# Patient Record
Sex: FEMALE | Race: WHITE
[De-identification: names, ages, dates, MRNs, and addresses within clinical notes are randomized per-mention and may not be internally consistent; named-entity substitution may affect disease eponyms.]

---

## 2017-01-18 ENCOUNTER — HOSPITAL ENCOUNTER (OUTPATIENT)
Dept: HOSPITAL 75 - CARD | Age: 47
End: 2017-01-18
Attending: INTERNAL MEDICINE
Payer: COMMERCIAL

## 2017-01-18 DIAGNOSIS — Z72.0: ICD-10-CM

## 2017-01-18 DIAGNOSIS — L98.499: ICD-10-CM

## 2017-01-18 DIAGNOSIS — M35.8: ICD-10-CM

## 2017-01-18 DIAGNOSIS — I77.1: Primary | ICD-10-CM

## 2017-01-18 PROCEDURE — 93306 TTE W/DOPPLER COMPLETE: CPT

## 2017-01-19 NOTE — ECHOCARDIOGRAPHY REPORT
PROCEDURE PHYSICIAN:   BRAULIO REED

 

DATE OF PROCEDURE:  01/18/2017

 

TWO DIMENSIONAL ECHOCARDIOGRAM REPORT 

 

PRIMARY PHYSICIAN:       

OTHER PHYSICIAN:         

REFERRING PHYSICIAN:          Dr. Tirstan 

ORDERING PHYSICIAN:      

 

INDICATION FOR THE PROCEDURE:  

1.   Coronary artery disease.  

2.   Dyspnea.  

 

MEASUREMENTS                  DERIVED VALUES 

 

LV DIAMETER (LAX)   NORMALS                       NORMALS

Diastolic      4.4  (3.6-5.2)      Eject. Fract.  60% (60%+/-6%)

      

Systolic            (2.3-3.9)      Diastolic Vol.      

% Shortening        (0.22-0.42)    Systolic Vol.       

                                   Aortic Root         

IVS THICKNESS 

Diastolic      1    (0.6-1.1)

 

LVPW THICKNESS 

Diastolic      1    (0.6-1.1)

 

LA DIAMETER 

Systolic       2.8  (2.1-3.7)  

 

 

FINDINGS:

1.   Technical quality is good. 

2.   The left ventricle is normal in size with normal

contractility. Systolic function appeared to be normal. Estimated

ejection fraction 60%. 

3.   The left atrium is normal in size. No clot or thrombus were

seen within the left atrium. 

4.   The right atrium and right ventricle are normal in size. No

clot or thrombus were seen within the right side. 

5.   Mitral valve is normal in morphology with mild mitral

regurgitation noted by color Doppler flow. No mitral valve

prolapse. No mitral valve stenosis. 

6.   Aortic valve is trileaflet with normal opening and closing

pattern. No significant aortic stenosis or regurgitation was

seen. 

7.   Tricuspid valve is normal in morphology with mild tricuspid

regurgitation noted by color Doppler flow. Doppler across

tricuspid valve estimated pulmonary artery pressure of 6+ right

atrial pressure. 

8.   Pulmonic valve is functioning normally. 

9.   No pericardial effusion. 

 

IN CONCLUSION:

1.   Normal left ventricular size and systolic function.

Estimated ejection fraction 60%. 

2.   Mild mitral and tricuspid regurgitation. 

3.   Estimated pulmonary artery pressure of 10 to 15 mmHg.   

 

 

 

 

Job ID: [id number]

Dictated Date: 01/18/2017 16:46:11 

Transcription Date: 01/19/2017 07:48:13 / feng

## 2017-01-23 ENCOUNTER — HOSPITAL ENCOUNTER (OUTPATIENT)
Dept: HOSPITAL 75 - CARD | Age: 47
End: 2017-01-23
Attending: INTERNAL MEDICINE
Payer: COMMERCIAL

## 2017-01-23 VITALS — HEIGHT: 67 IN | WEIGHT: 183 LBS | BODY MASS INDEX: 28.72 KG/M2

## 2017-01-23 VITALS — SYSTOLIC BLOOD PRESSURE: 152 MMHG | DIASTOLIC BLOOD PRESSURE: 83 MMHG

## 2017-01-23 DIAGNOSIS — I77.1: ICD-10-CM

## 2017-01-23 DIAGNOSIS — I73.9: ICD-10-CM

## 2017-01-23 DIAGNOSIS — Z01.810: Primary | ICD-10-CM

## 2017-01-23 DIAGNOSIS — L98.499: ICD-10-CM

## 2017-01-23 DIAGNOSIS — R06.02: ICD-10-CM

## 2017-01-23 DIAGNOSIS — M35.8: ICD-10-CM

## 2017-01-23 DIAGNOSIS — Z72.0: ICD-10-CM

## 2017-01-23 PROCEDURE — 93017 CV STRESS TEST TRACING ONLY: CPT

## 2017-01-23 PROCEDURE — 78452 HT MUSCLE IMAGE SPECT MULT: CPT

## 2017-01-23 NOTE — XMS REPORT
Continuity of Care Document

 Created on: 10/03/2016



TANIA WILLIAM

External Reference #: [id number]

: 1970

Sex: Female



Demographics







 Address  [address] [place]  [address]

 

 Home Phone  [phone]

 

 Preferred Language  English

 

 Marital Status  Unknown

 

 Yarsanism Affiliation  Unknown

 

 Race  Unknown

 

 Ethnic Group  Unknown





Author







 Author  [facility]

 

 Organization  [facility]

 

 Address  Unknown

 

 Phone  Unavailable







Support







 Name  Relationship  Address  Phone

 

 DEEPAK LEE APRN  Caregiver  [address] [place]  [address] [phone]

 

 ANDREW EWING DO  Caregiver  [address] [place]  [address] [phone]

 

 GENEVIEVE WILLIAM  Next Of Kin  [address] [place]  [address] [phone]







Care Team Providers







 Care Team Member Name  Role  Phone

 

 ANDREW EWING DO  PCP  [phone]







Insurance Providers







 Payer Name  Policy Number  Subscriber Name  Relationship

 

 [employer]  [id number]  Tania William  16 Self / Same As Patient







Advance Directives







 Directive  Response  Recorded Date/Time

 

 Advance Directives  No  16 11:49am

 

 Health Care Power of   No  16 11:49am

 

 Organ Donor  No  16 11:49am







Problems

Active Problems







 Medical Problem  Onset Date  Status

 

 Raynauds syndrome  Unknown  Acute







Medications

Current Home Medications







 Medication  Dose  Units  Route  Directions  Days/Qty  Instructions  Start Date

 

 Tiotropium Bromide 4 Gm  2  Puff  Inhalation  Once as needed for Daily        
16

 

 Budesonide/Formoterol Fumarate 10.2 Gm  1  Puff  Inhalation  Daily        

 

 Aspirin 81 Mg  81  Mg  Oral  Daily        16

 

 Meclizine Hcl 25 Mg  25  Mg  Oral  Twice A Day as needed for Vertigo        

 

 Naproxen 500 Mg  500  Mg  Oral  Twice A Day With Meals        16

 

 Gabapentin 300 Mg  300  Mg  Oral  Twice A Day        16

 

 Loratadine 10 Mg  10  Mg  Oral  Daily        16

 

 Amlodipine Besylate 10 Mg  10  Mg  Oral  Daily        16

 

 Povidone-Iodine 59 Ml        Topical  Twice A Day     FOR USE ON LEFT HAND 
MIDDLE FINGER  16

 

 Clopidogrel Bisulfate 75 Mg  75  Mg  Oral  Daily  16

 

 Pantoprazole Sodium 40 Mg  40  Mg  Oral  Daily  16





Past Home Medications







 Medication  Directions  Ordered  Status

 

 Methylprednisolone 4 Mg/Dose-Pack Tab.ds.pk, 0  Oral  As Directed  11/10/11  
Discontinued

 

 Amlodipine Besylate 2.5 Mg Tablet, 2.5 Mg Oral  Daily  16  Discontinued







Social History







 Social History Problem  Response  Recorded Date/Time

 

 Alcohol Use  Denies Use  2016 9:24pm

 

 Recreational Drug Use  No  2016 9:24pm

 

 Recent Foreign Travel  No  2016 1:04pm

 

 Type Used  Cigarettes  2016 12:03pm







Hospital Discharge Instructions

No hospital discharge instructions.



Plan of Care







 Prescriptions  See Medication Section







Functional Status

No functional status results.



Allergies, Adverse Reactions, Alerts







 Allergen  Type  Severity  Reaction  Status  Last Updated

 

 oxycodone HCl  Allergy  Severe  SOA, RASH  Active  11/10/11

 

 Erythromycin Lactobionate  Allergy  Severe  SOA, RASH  Active  11/10/11

 

 Sulfa (Sulfonamide Antibiotics) ([id number])  Allergy  Unknown     Active  

 

 Codeine  Allergy  Unknown     Active  07

 

 Acetaminophen  Allergy  Severe  SOA, RASH  Active  11/10/11

 

 methocarbamol ([id number])  Allergy  Severe  SOA, RASH  Active  11/10/11







Immunizations

No immunization records.



Vital Signs

No known vital signs results.



Results

No known relevant diagnostic tests, laboratory data and/or discharge summary.



Procedures

No known history of procedures.



Encounters







 Encounter  Location  Arrival/Admit Date  Discharge/Depart Date  Attending 
Provider

 

 Discharged Recurring  [facility]  16 3:08pm  10/03/
16 9:17am  DEEPAK LEE

## 2017-01-23 NOTE — STRESS TEST
PROCEDURE PHYSICIAN:   BRAULIO REED

 

LEXISCAN MYOVIEW STRESS TEST REPORT 

 

DATE OF PROCEDURE:  

01/23/2017

 

REFERRING PHYSICIAN: 

Dr. Tristan

 

INDICATION FOR THE PROCEDURE:

Coronary artery disease, shortness of breath. 

 

Baseline heart rate is 68, baseline blood pressure: 157/75

baseline EKG: Sinus rhythm with no ischemic changes. 

 

SUMMARY:

The patient was injected with 10.77 mCi of technetium 99 Myoview

and the resting images were obtained. Then the patient received

0.4 mg of Lexiscan followed by 29.2 mCi of technetium 99 Myoview.

Throughout the test, there were no EKG changes. 

 

The resting and stress images were reviewed and compared in the

short axis, horizontal long axis, and vertical long axis views.

Review of the images showed breast attenuation affecting the

quality of the images, there is mild decreased uptake at the mid

to apical anterior wall, with subtle reversibility. No

significant ischemia was noted. SSS is 2, SDS 2, TID value 1.14.

On the gated images, the left ventricle appeared to be normal

size with normal contractility. Calculated ejection fraction 59%.

 

 

CONCLUSION:

1.   The patient tolerated Lexiscan well. 

2.   Breast attenuation with mild decreased uptake at the mid to

apical anterior wall. Subtle reversibility. No significant

ischemia was noted. 

3.   Normal left ventricular size with normal contractility.

Calculated ejection fraction 59%.  

 

 

 

Job ID: [id number]

Dictated Date: 01/23/2017 10:58:16 

Transcription Date: 01/23/2017 11:25:25 / [email or web address]

## 2017-01-25 ENCOUNTER — HOSPITAL ENCOUNTER (OUTPATIENT)
Dept: HOSPITAL 75 - CATH | Age: 47
LOS: 1 days | Discharge: HOME | End: 2017-01-26
Attending: INTERNAL MEDICINE
Payer: COMMERCIAL

## 2017-01-25 VITALS — DIASTOLIC BLOOD PRESSURE: 74 MMHG | SYSTOLIC BLOOD PRESSURE: 147 MMHG

## 2017-01-25 VITALS — SYSTOLIC BLOOD PRESSURE: 138 MMHG | DIASTOLIC BLOOD PRESSURE: 81 MMHG

## 2017-01-25 VITALS — BODY MASS INDEX: 27.79 KG/M2 | WEIGHT: 177.06 LBS | HEIGHT: 67 IN

## 2017-01-25 VITALS — SYSTOLIC BLOOD PRESSURE: 148 MMHG | DIASTOLIC BLOOD PRESSURE: 88 MMHG

## 2017-01-25 VITALS — SYSTOLIC BLOOD PRESSURE: 145 MMHG | DIASTOLIC BLOOD PRESSURE: 76 MMHG

## 2017-01-25 VITALS — DIASTOLIC BLOOD PRESSURE: 83 MMHG | SYSTOLIC BLOOD PRESSURE: 155 MMHG

## 2017-01-25 VITALS — DIASTOLIC BLOOD PRESSURE: 69 MMHG | SYSTOLIC BLOOD PRESSURE: 130 MMHG

## 2017-01-25 VITALS — SYSTOLIC BLOOD PRESSURE: 156 MMHG | DIASTOLIC BLOOD PRESSURE: 93 MMHG

## 2017-01-25 DIAGNOSIS — I70.92: ICD-10-CM

## 2017-01-25 DIAGNOSIS — Z72.0: ICD-10-CM

## 2017-01-25 DIAGNOSIS — I73.01: ICD-10-CM

## 2017-01-25 DIAGNOSIS — I25.10: ICD-10-CM

## 2017-01-25 DIAGNOSIS — R94.39: Primary | ICD-10-CM

## 2017-01-25 DIAGNOSIS — I70.203: ICD-10-CM

## 2017-01-25 DIAGNOSIS — Z79.899: ICD-10-CM

## 2017-01-25 DIAGNOSIS — L98.499: ICD-10-CM

## 2017-01-25 LAB
ALBUMIN SERPL-MCNC: 4.3 G/DL (ref 3.2–4.5)
ALT SERPL-CCNC: 15 U/L (ref 0–55)
ANION GAP SERPL CALC-SCNC: 9 MMOL/L (ref 5–14)
APTT BLD: 25 SEC (ref 24–35)
AST SERPL-CCNC: 14 U/L (ref 5–34)
BILIRUB SERPL-MCNC: 0.5 MG/DL (ref 0.1–1)
BILIRUB UR QL STRIP: NEGATIVE
BUN SERPL-MCNC: 10 MG/DL (ref 7–18)
BUN/CREAT SERPL: 13
CALCIUM SERPL-MCNC: 9 MG/DL (ref 8.5–10.1)
CHLORIDE SERPL-SCNC: 106 MMOL/L (ref 98–107)
CHOLEST SERPL-MCNC: 209 MG/DL (ref ?–200)
CO2 SERPL-SCNC: 24 MMOL/L (ref 21–32)
CREAT SERPL-MCNC: 0.75 MG/DL (ref 0.6–1.3)
ERYTHROCYTE [DISTWIDTH] IN BLOOD BY AUTOMATED COUNT: 13.7 % (ref 10–14.5)
GFR SERPLBLD BASED ON 1.73 SQ M-ARVRAT: > 60 ML/MIN
GLUCOSE SERPL-MCNC: 89 MG/DL (ref 70–105)
INR PPP: 1 (ref 0.8–1.4)
KETONES UR QL STRIP: NEGATIVE
LDLC SERPL DIRECT ASSAY-MCNC: 162 MG/DL (ref 1–129)
LEUKOCYTE ESTERASE UR QL STRIP: NEGATIVE
MCH RBC QN AUTO: 30 PG (ref 25–34)
MCHC RBC AUTO-ENTMCNC: 33 G/DL (ref 32–36)
MCV RBC AUTO: 93 FL (ref 80–99)
NITRITE UR QL STRIP: NEGATIVE
PH UR STRIP: 6.5 [PH] (ref 5–9)
PLATELET # BLD: 336 10^3/UL (ref 130–400)
PMV BLD AUTO: 11 FL (ref 7.4–10.4)
POTASSIUM SERPL-SCNC: 3.6 MMOL/L (ref 3.6–5)
PROT SERPL-MCNC: 6.9 G/DL (ref 6.4–8.2)
PROT UR QL STRIP: NEGATIVE
PROTHROMBIN TIME: 12.6 SEC (ref 12.2–14.7)
RBC # BLD AUTO: 4.95 10^6/UL (ref 4.35–5.85)
SODIUM SERPL-SCNC: 139 MMOL/L (ref 135–145)
SP GR UR STRIP: 1.01 (ref 1.02–1.02)
SQUAMOUS #/AREA URNS HPF: (no result) /HPF
TRIGL SERPL-MCNC: 146 MG/DL (ref ?–150)
UROBILINOGEN UR-MCNC: NORMAL MG/DL
VLDLC SERPL CALC-MCNC: 29 MG/DL (ref 5–40)
WBC # BLD AUTO: 11 10^3/UL (ref 4.3–11)
WBC #/AREA URNS HPF: (no result) /HPF

## 2017-01-25 PROCEDURE — 85027 COMPLETE CBC AUTOMATED: CPT

## 2017-01-25 PROCEDURE — 87081 CULTURE SCREEN ONLY: CPT

## 2017-01-25 PROCEDURE — 37221: CPT

## 2017-01-25 PROCEDURE — 81000 URINALYSIS NONAUTO W/SCOPE: CPT

## 2017-01-25 PROCEDURE — 80061 LIPID PANEL: CPT

## 2017-01-25 PROCEDURE — 85610 PROTHROMBIN TIME: CPT

## 2017-01-25 PROCEDURE — 93005 ELECTROCARDIOGRAM TRACING: CPT

## 2017-01-25 PROCEDURE — 36221 PLACE CATH THORACIC AORTA: CPT

## 2017-01-25 PROCEDURE — 80053 COMPREHEN METABOLIC PANEL: CPT

## 2017-01-25 PROCEDURE — 85347 COAGULATION TIME ACTIVATED: CPT

## 2017-01-25 PROCEDURE — 36415 COLL VENOUS BLD VENIPUNCTURE: CPT

## 2017-01-25 PROCEDURE — 80048 BASIC METABOLIC PNL TOTAL CA: CPT

## 2017-01-25 PROCEDURE — 93458 L HRT ARTERY/VENTRICLE ANGIO: CPT

## 2017-01-25 PROCEDURE — 85730 THROMBOPLASTIN TIME PARTIAL: CPT

## 2017-01-25 PROCEDURE — 71010: CPT

## 2017-01-25 PROCEDURE — 75630 X-RAY AORTA LEG ARTERIES: CPT

## 2017-01-25 RX ADMIN — OMEGA-3 FATTY ACIDS CAP 1000 MG SCH MG: 1000 CAP at 16:34

## 2017-01-25 RX ADMIN — SODIUM CHLORIDE SCH MLS/HR: 900 INJECTION, SOLUTION INTRAVENOUS at 12:04

## 2017-01-25 RX ADMIN — SODIUM CHLORIDE SCH MLS/HR: 900 INJECTION, SOLUTION INTRAVENOUS at 22:30

## 2017-01-25 RX ADMIN — MECLIZINE HYDROCHLORIDE SCH MG: 25 TABLET ORAL at 19:52

## 2017-01-25 NOTE — XMS REPORT
Continuity of Care Document

 Created on: 10/03/2016



TANIA WILLIAM

External Reference #: [id number]

: 1970

Sex: Female



Demographics







 Address  [address] [place]  [address]

 

 Home Phone  [phone]

 

 Preferred Language  English

 

 Marital Status  Unknown

 

 [employer] Affiliation  Unknown

 

 Race  Unknown

 

 Ethnic Group  Unknown





Author







 Author  [facility]

 

 Organization  [facility]

 

 Address  Unknown

 

 Phone  Unavailable







Support







 Name  Relationship  Address  Phone

 

 DEEPAK LEE APRN  Caregiver  [address] [place]  [address] [phone]

 

 ANDREW EWING DO  Caregiver  [address] [place]  [address] [phone]

 

 GENEVIEVE WILLIAM  Next Of Kin  [address] [place]  [address] [phone]







Care Team Providers







 Care Team Member Name  Role  Phone

 

 ANDREW EWING DO  PCP  [phone]







Insurance Providers







 Payer Name  Policy Number  Subscriber Name  Relationship

 

 [employer]  [id number]  Tania William  16 Self / Same As Patient







Advance Directives







 Directive  Response  Recorded Date/Time

 

 Advance Directives  No  16 11:49am

 

 Health Care Power of   No  16 11:49am

 

 Organ Donor  No  16 11:49am







Problems

Active Problems







 Medical Problem  Onset Date  Status

 

 Raynauds syndrome  Unknown  Acute







Medications

Current Home Medications







 Medication  Dose  Units  Route  Directions  Days/Qty  Instructions  Start Date

 

 Tiotropium Bromide 4 Gm  2  Puff  Inhalation  Once as needed for Daily        
16

 

 Budesonide/Formoterol Fumarate 10.2 Gm  1  Puff  Inhalation  Daily        

 

 Aspirin 81 Mg  81  Mg  Oral  Daily        16

 

 Meclizine Hcl 25 Mg  25  Mg  Oral  Twice A Day as needed for Vertigo        

 

 Naproxen 500 Mg  500  Mg  Oral  Twice A Day With Meals        16

 

 Gabapentin 300 Mg  300  Mg  Oral  Twice A Day        16

 

 Loratadine 10 Mg  10  Mg  Oral  Daily        16

 

 Amlodipine Besylate 10 Mg  10  Mg  Oral  Daily        16

 

 Povidone-Iodine 59 Ml        Topical  Twice A Day     FOR USE ON LEFT HAND 
MIDDLE FINGER  16

 

 Clopidogrel Bisulfate 75 Mg  75  Mg  Oral  Daily  16

 

 Pantoprazole Sodium 40 Mg  40  Mg  Oral  Daily  16





Past Home Medications







 Medication  Directions  Ordered  Status

 

 Methylprednisolone 4 Mg/Dose-Pack Tab.ds.pk, 0  Oral  As Directed  11/10/11  
Discontinued

 

 Amlodipine Besylate 2.5 Mg Tablet, 2.5 Mg Oral  Daily  16  Discontinued







Social History







 Social History Problem  Response  Recorded Date/Time

 

 Alcohol Use  Denies Use  2016 9:24pm

 

 Recreational Drug Use  No  2016 9:24pm

 

 Recent Foreign Travel  No  2016 1:04pm

 

 Type Used  Cigarettes  2016 12:03pm







Hospital Discharge Instructions

No hospital discharge instructions.



Plan of Care







 Prescriptions  See Medication Section







Functional Status

No functional status results.



Allergies, Adverse Reactions, Alerts







 Allergen  Type  Severity  Reaction  Status  Last Updated

 

 oxycodone HCl  Allergy  Severe  SOA, RASH  Active  11/10/11

 

 Erythromycin Lactobionate  Allergy  Severe  SOA, RASH  Active  11/10/11

 

 Sulfa (Sulfonamide Antibiotics) ([id number])  Allergy  Unknown     Active  

 

 Codeine  Allergy  Unknown     Active  07

 

 Acetaminophen  Allergy  Severe  SOA, RASH  Active  11/10/11

 

 methocarbamol ([id number])  Allergy  Severe  SOA, RASH  Active  11/10/11







Immunizations

No immunization records.



Vital Signs

No known vital signs results.



Results

No known relevant diagnostic tests, laboratory data and/or discharge summary.



Procedures

No known history of procedures.



Encounters







 Encounter  Location  Arrival/Admit Date  Discharge/Depart Date  Attending 
Provider

 

 Discharged Recurring  [facility]  16 3:08pm  10/03/
16 9:17am  DEEPAK LEE

## 2017-01-25 NOTE — CARDIAC PROCEDURE NOTE-CS/ASA
Pre-Procedure Note


Pre-Op Procedure Note


H&P Reviewed


The H&P was reviewed, patient examined and no changes noted.


Date H&P Reviewed:  Jan 25, 2017


Time H&P Reviewed:  10:39





Conscious Sedation Pre-Proced


Time Reviewed:  10:39


ASA Class:  3











Airway Mallampati Classification: ([place] appropriate class) I.  II.  III,  IV


 


Lungs 


 


Heart 


 


 ASA score


 


 ASA 1: a normal healthy patient


 


 ASA 2:  a patient with a mild systemic disease (mid diabetes, controlled 

hypertension, obesity 


 


X ASA 3:  a patient with a severe systemic disease that limits activity  (angina

, COPD, prior Myocardial infarction)


 


 ASA 4:  a patient with an incapacitating disease that is a constant threat to 

life (CHF, renal failure)


 


 ASA 5:  a moribund patient not expected to survive 24 hrs.  (ruptured aneurysm)


 


 ASA 6:  a declared brain dead patient whose organs are being harvested.


 


 For emergent operations, add the letter E after the classification








Grade 3


Sedation Plan:  Analgesia, Amnesia, Plan communicated to team members, 

Discussed options with patient/fam, Discussed risks with patient/fam


Note


The patient is an appropriate candidate to undergo the planned procedure, 

sedation, and anesthesia.





The patient immediately re-assessed prior to indication.








BRAULIO REED MD Jan 25, 2017 10:39

## 2017-01-25 NOTE — XMS REPORT
Continuity of Care Document

 Created on: 10/03/2016



TANIA WILLIAM

External Reference #: [id number]

: 1970

Sex: Female



Demographics







 Address  [address] [place]  [address]

 

 Home Phone  [phone]

 

 Preferred Language  English

 

 Marital Status  Unknown

 

 Adventist Affiliation  Unknown

 

 Race  Unknown

 

 Ethnic Group  Unknown





Author







 Author  [facility]

 

 Organization  [facility]

 

 Address  Unknown

 

 Phone  Unavailable







Support







 Name  Relationship  Address  Phone

 

 DEEPAK LEE APRN  Caregiver  [address] [place]  [address] [phone]

 

 ANDREW EWING DO  Caregiver  [address] [place]  [address] [phone]

 

 GENEVIEVE WILLIAM  Next Of Kin  [address] [place]  [address] [phone]







Care Team Providers







 Care Team Member Name  Role  Phone

 

 ANDREW EWING DO  PCP  [phone]







Insurance Providers







 Payer Name  Policy Number  Subscriber Name  Relationship

 

 [employer]  [id number]  Tania William  16 Self / Same As Patient







Advance Directives







 Directive  Response  Recorded Date/Time

 

 Advance Directives  No  16 11:49am

 

 Health Care Power of   No  16 11:49am

 

 Organ Donor  No  16 11:49am







Problems

Active Problems







 Medical Problem  Onset Date  Status

 

 Raynauds syndrome  Unknown  Acute







Medications

Current Home Medications







 Medication  Dose  Units  Route  Directions  Days/Qty  Instructions  Start Date

 

 Tiotropium Bromide 4 Gm  2  Puff  Inhalation  Once as needed for Daily        
16

 

 Budesonide/Formoterol Fumarate 10.2 Gm  1  Puff  Inhalation  Daily        

 

 Aspirin 81 Mg  81  Mg  Oral  Daily        16

 

 Meclizine Hcl 25 Mg  25  Mg  Oral  Twice A Day as needed for Vertigo        

 

 Naproxen 500 Mg  500  Mg  Oral  Twice A Day With Meals        16

 

 Gabapentin 300 Mg  300  Mg  Oral  Twice A Day        16

 

 Loratadine 10 Mg  10  Mg  Oral  Daily        16

 

 Amlodipine Besylate 10 Mg  10  Mg  Oral  Daily        16

 

 Povidone-Iodine 59 Ml        Topical  Twice A Day     FOR USE ON LEFT HAND 
MIDDLE FINGER  16

 

 Clopidogrel Bisulfate 75 Mg  75  Mg  Oral  Daily  16

 

 Pantoprazole Sodium 40 Mg  40  Mg  Oral  Daily  16





Past Home Medications







 Medication  Directions  Ordered  Status

 

 Methylprednisolone 4 Mg/Dose-Pack Tab.ds.pk, 0  Oral  As Directed  11/10/11  
Discontinued

 

 Amlodipine Besylate 2.5 Mg Tablet, 2.5 Mg Oral  Daily  16  Discontinued







Social History







 Social History Problem  Response  Recorded Date/Time

 

 Alcohol Use  Denies Use  2016 9:24pm

 

 Recreational Drug Use  No  2016 9:24pm

 

 Recent Foreign Travel  No  2016 1:04pm

 

 Type Used  Cigarettes  2016 12:03pm







Hospital Discharge Instructions

No hospital discharge instructions.



Plan of Care







 Prescriptions  See Medication Section







Functional Status

No functional status results.



Allergies, Adverse Reactions, Alerts







 Allergen  Type  Severity  Reaction  Status  Last Updated

 

 oxycodone HCl  Allergy  Severe  SOA, RASH  Active  11/10/11

 

 Erythromycin Lactobionate  Allergy  Severe  SOA, RASH  Active  11/10/11

 

 Sulfa (Sulfonamide Antibiotics) ([id number])  Allergy  Unknown     Active  

 

 Codeine  Allergy  Unknown     Active  07

 

 Acetaminophen  Allergy  Severe  SOA, RASH  Active  11/10/11

 

 methocarbamol ([id number])  Allergy  Severe  SOA, RASH  Active  11/10/11







Immunizations

No immunization records.



Vital Signs

No known vital signs results.



Results

No known relevant diagnostic tests, laboratory data and/or discharge summary.



Procedures

No known history of procedures.



Encounters







 Encounter  Location  Arrival/Admit Date  Discharge/Depart Date  Attending 
Provider

 

 Discharged Recurring  [facility]  16 3:08pm  10/03/
16 9:17am  DEEPAK LEE

## 2017-01-25 NOTE — DIAGNOSTIC IMAGING REPORT
INDICATION:

Shortness breath, tobacco use.



EXAMINATION:

Portable chest at 9:16 AM.



FINDINGS:

There are some emphysematous changes in the lungs with scarring.

There is some scarring and/or granulomatous change at the right

lung base. All of these appear stable compared to 08/31/2016.

There are no infiltrates, effusions, or pneumothoraces.



IMPRESSION:

No acute abnormality is seen. No interval change compared to

08/31/2016.



Dictated by:



Dictated on workstation # [id number]

## 2017-01-26 VITALS — DIASTOLIC BLOOD PRESSURE: 97 MMHG | SYSTOLIC BLOOD PRESSURE: 142 MMHG

## 2017-01-26 VITALS — DIASTOLIC BLOOD PRESSURE: 69 MMHG | SYSTOLIC BLOOD PRESSURE: 134 MMHG

## 2017-01-26 VITALS — SYSTOLIC BLOOD PRESSURE: 134 MMHG | DIASTOLIC BLOOD PRESSURE: 69 MMHG

## 2017-01-26 VITALS — DIASTOLIC BLOOD PRESSURE: 83 MMHG | SYSTOLIC BLOOD PRESSURE: 126 MMHG

## 2017-01-26 LAB
ANION GAP SERPL CALC-SCNC: 9 MMOL/L (ref 5–14)
BUN SERPL-MCNC: 6 MG/DL (ref 7–18)
BUN/CREAT SERPL: 8
CALCIUM SERPL-MCNC: 8.9 MG/DL (ref 8.5–10.1)
CHLORIDE SERPL-SCNC: 105 MMOL/L (ref 98–107)
CO2 SERPL-SCNC: 23 MMOL/L (ref 21–32)
CREAT SERPL-MCNC: 0.74 MG/DL (ref 0.6–1.3)
ERYTHROCYTE [DISTWIDTH] IN BLOOD BY AUTOMATED COUNT: 13.8 % (ref 10–14.5)
GFR SERPLBLD BASED ON 1.73 SQ M-ARVRAT: > 60 ML/MIN
GLUCOSE SERPL-MCNC: 87 MG/DL (ref 70–105)
MCH RBC QN AUTO: 30 PG (ref 25–34)
MCHC RBC AUTO-ENTMCNC: 32 G/DL (ref 32–36)
MCV RBC AUTO: 93 FL (ref 80–99)
PLATELET # BLD: 337 10^3/UL (ref 130–400)
PMV BLD AUTO: 11 FL (ref 7.4–10.4)
POTASSIUM SERPL-SCNC: 3.5 MMOL/L (ref 3.6–5)
RBC # BLD AUTO: 4.76 10^6/UL (ref 4.35–5.85)
SODIUM SERPL-SCNC: 137 MMOL/L (ref 135–145)
WBC # BLD AUTO: 13.2 10^3/UL (ref 4.3–11)

## 2017-01-26 RX ADMIN — MECLIZINE HYDROCHLORIDE SCH MG: 25 TABLET ORAL at 06:48

## 2017-01-26 RX ADMIN — OMEGA-3 FATTY ACIDS CAP 1000 MG SCH MG: 1000 CAP at 06:45

## 2017-01-26 RX ADMIN — SODIUM CHLORIDE SCH MLS/HR: 900 INJECTION, SOLUTION INTRAVENOUS at 06:52

## 2017-01-26 NOTE — DISCHARGE INST-POST CATH
Discharge Inst-CATH


Post Cardiac Cath D/C Inst


Follow Up/Plan


Appointment with Dr. Ordaz's office in 2-4 weeks


CARDIAC CATH DISCHARGE INSTRUCTIONS





*Hold Metformin for 48 hours post heart cath.





ACTIVITY





* Go Home directly and rest.


* Limit activity of the leg (or wrist if it was used) for 7 days including 

aerobics, swimming,


   jogging, bicycling, etc.


* Restrict stair-climbing for 7 days if possible, if not, climb up with your non

-cath leg, then


   bring together on the same step.


* Avoid lifting, pushing, pulling or excessive movement of the affected 

extremity for 7 days.


* Customary sexual activity may be resumed after 2 days-use caution not to use 

a position  


   that strains or causes pain to the affected extremity.


* No driving for 24 hours.


* NO SMOKING. 


* Avoid straining for bowel movements for 7 days.


* Gentle walking on level ground is allowed.


* Returning to work will depend on the type of procedure and the results. Your 

doctor will discuss


   this with you.





CALL YOUR DOCTOR FOR ANY OF THE FOLLOWING:





*If bleeding from the puncture site occurs- Apply gentle pressure to site with 

clean cloth and call


   your doctor or EMS.


* If a knot or lump forms under the skin, increases in size, or causes pain.


* If bruising appears to be worsening or moving further down your leg instead 

of disappearing.


* Temperature above 101 F.





CARE OF YOUR GROIN INCISION;





* Bruising or purple discoloration of the skin near the puncture site is common.


* You may shower only, no bathtub bathing for 5 days.  Be careful to avoid 

slipping as your


   leg may feel stiff.


* If a closure device was used on your femoral artery, please see the attached 

guide regarding


   care of the device and your leg.


* REMOVE the dressing from your groin the next day after your procedure in the 

shower.





CARE OF YOUR WRIST INCISION;





* Bruising or purple discoloration of the skin near the puncture site is common.


* You may shower.


* DO NOT submerge wrist.


* Remove dressing in 24 hours.








BRAULIO ORDAZ MD Jan 26, 2017 08:01

## 2017-01-26 NOTE — CARDIAC CATHETERIZATION
PROCEDURE PHYSICIAN:   BRAULIO REED

 

CARDIAC CATHETERIZATION REPORT WITH PERIPHERAL ANGIOGRAM 

 

DATE OF PROCEDURE:  

01/25/2017

 

REFERRING PHYSICIAN: 

Dr. Hardy 

 

BRIEF HISTORY:

Mrs. Rizzo is a 46-year-old lady with history of peripheral

arterial disease.  She had subclavian stenosis and a stent placed

in her left subclavian artery. She has been having lower

extremity pain. Had an abnormal VALARIE is 0.77 on the right and 0.58

on the left. The patient had an abnormal stress test with breast

attenuation and decreased uptake at the mid to apical anterior

wall, with subtle reversibility. She was scheduled for left heart

catheterization, possible PTCA and peripheral angiogram. 

 

PROCEDURE NOTE:

After explaining the procedure to the patient, all pros and cons

were explained. All questions were answered. The patient signed a

consent, then she was placed on the cardiac catheterization

laboratory. The right groin was prepped in a sterile fashion.

Local anesthesia applied to the right groin. 6-[place] sheath was

placed in the right femoral artery. Combination of right and left

David catheter were used to access the right and left coronary

system. Multiple views were used. Pigtail catheter advanced to

the left ventricular cavity. Pressure was measured. No left

ventriculogram was done. Pullback LV to aorta was done. The

aortic arch angiogram was done. Then I pulled pigtail catheter to

the abdominal aorta an abdominal aortogram with runoff to the

lower extremity was done. At that point, the patient was noted to

have total occlusion of the left iliac artery, common iliac

artery. I attempted to cross over from using an IM guide without

success. I proceeded with placement of a sheath on the left

groin. After placement of the sheet I used IMC, I placed it at

the ostium of occlusion then I was able to advance a glidewire

across the lesion. Proceeded with balloon dilatation using 5.0 x

40 mm balloon, [place] 35 balloon without any complication.

Multiple inflations were made.  Then after that through the left

sheath I was able to advance a Storq wire easily without

difficulties, proceed with placement of the pigtail catheter in

the abdominal aorta.  Did angiogram again, then proceeded with

stent deployment using Omnilink elite 7 x 59 x 80 metal stent

expanded over a balloon at the common femoral area. It appears

that the artery is slightly larger. I reintroduced the [place]

balloon 8 mm and expanded the distal portion of the stent without

complication. Angiogram showed excellent result. Pressure was

measured at the left groin prior to the procedure and after the

procedure showing significant improvement. At that point both

sheaths were removed. Mynx device deployed. Hemostasis achieved.

 

 

HEMODYNAMICS: 

The left ventricle pressure is 111/14, end-diastolic pressure 14,

aortic pressure 111/60, mean of 80 no significant gradient across

the aortic valve. 

 

ANATOMY:   

1.   Left main coronary artery is bifurcating to left anterior

descending and left circumflex artery with no obstructive

disease. 

2.   Left anterior descending artery is moderate in size with

mild irregularity. No significant obstructive disease. 

3.   Left circumflex artery is moderate in size, 60% ostial left

circumflex artery stenosis. It appeared to be moderate. 

4.   Right coronary artery is moderate in size with mild disease,

nonobstructive disease. 

5.   Aortic arch angiogram: Aortic arch was done in the left

anterior oblique position.  The aortic arch is normal in size.

Origin of the carotid artery, innominate artery and left

subclavian artery appeared patent, the stent in the left

subclavian artery is patent. 

6.   Abdominal aortogram with bilateral runoff:  Abdominal

aortogram was done with  automatic injection with bilateral

runoff of the lower extremities. The abdominal aorta is normal in

size. There is mild disease on the right side, down to the

trifurcation; the flow was brisk. There is total occlusion on the

left side at the left common iliac artery. 

 

PERCUTANEOUS INTERVENTION:  

The patient had total occlusion of the left common iliac artery.

Successful complex intervention, then deployment of Omnilink

Elite bare-metal stent 7 x 59 x 80 with restoration of the flow

with expanded at the common iliac area to 7 mm and distally at

the common femoral area to 8 mm with excellent results. Pressure

gradient returned to normal immediately after the intervention. 

 

CONCLUSION:

1.   Total occlusion of the left common iliac artery, status post

percutaneous intervention, then stent deployment using Omnilink

Elite 7.0 x 59 mm bare metal stent, expanded to 7 mm at the iliac

area and 8 mm the femoral area with excellent results. 

2.   Patent stent in the left subclavian artery with no

obstructive disease. 

3.   60% ostial left circumflex artery stenosis, nonobstructive

disease, mild to moderate disease in the LAD and right coronary

artery. 

 

DISCUSSION AND RECOMMENDATION:  

Mrs. Rizzo had ischemia in the anterior wall does not match 60%

ostial circumflex lesion. Medical therapy is recommended. I will

monitor her closely and consider reevaluation in one year.

Possible FFR to that area. Continue with aspirin and Plavix,

encourage smoking cessation and started on Lipitor and fish oil.

Encourage compliance with medication. 

 

 

 

Job ID: [id number]

Dictated Date: 01/25/2017 12:16:19 

Transcription Date: 01/26/2017 10:17:17 / maddi

## 2017-01-26 NOTE — CARDIOLOGY PROGRESS NOTE
Subjective


Subjective/Events-last exam


patient is feeling well, both groins are healing well.  Palpable pulses 

bilaterally.


Review of Systems


General:  No Chills, No Night Sweats, No Fatigue, No Malaise, No Appetite, No 

Other


HEENT:  No Head Aches, No Visual Changes, No Eye Pain, No Ear Pain, No Dysphasia

, No Sinus Congestion, No Post Nasal Drip, No Sore Throat, No Other


Pulmonary:  No Dyspnea, No Cough, No Pleuritic Chest Pain, No Other


Cardiovascular:  No: Chest Pain, Edema, Lt Headedness, Orthopnea, Other, 

Palpitations, Paroxysmal Noc. Dyspnea





Objective-Cardiology


Exam


Last Set of Vital Signs





 Vital Signs








 1/26/17





 04:00


 


Temp 97.1


 


Pulse 85


 


Resp 18


 


B/P 142/97


 


Pulse Ox 99


 


O2 Delivery Room Air





Capillary Refill : Less Than 3 Seconds


I&O











 Intake and Output 


 


 1/26/17





 00:00


 


Intake Total 720 ml


 


Balance 720 ml


 


 


 


Intake Oral 720 ml


 


# Voids 2








General:  Alert, Oriented X3, Cooperative


HEENT:  Atraumatic, PERRLA


Neck:  Supple, No JVD, No Thyromegaly


Lungs:  Clear to Auscultation, Normal Air Movement


Heart:  Regular Rate, Normal S1, Normal S2, No Murmurs


Abdomen:  Normal Bowel Sounds, Soft, No Tenderness, No Hepatosplenomegaly, No 

Masses


Extremities:  No Clubbing, No Cyanosis, No Edema, Normal Pulses, No Tenderness/

Swelling


Skin:  No Rashes, No Breakdown, No Significant Lesion


Neuro:  Normal Gait, Normal Speech, Strength at 5/5 X4 Ext, Normal Tone, 

Sensation Intact


Psych/Mental Status:  Mental Status NL, Mood NL





Results


Lab


Laboratory Tests


1/25/17 09:14








1/26/17 04:38














A/P-Cardiology


Admission Diagnosis


coronary artery disease


Peripheral arterial disease


Hypertension


Hyperlipidemia


Tobaccoism





Assessment/Plan


coronary artery disease, ostial circumflex artery stenosis.  Moderate disease





Peripheral arterial disease total occlusion of the left common iliac status 

post pertains intervention with stent with excellent results.





History of subclavian stenosis with gangrenous finger.  Improving.





Hypertension, controlled.  Continue current medication





Hyperlipidemia, controlled





Tobaccoism educated again in length on smoking cessation








BRAULIO REED MD Jan 26, 2017 07:59

## 2017-01-26 NOTE — DISCHARGE SUMMARY
PROCEDURE PHYSICIAN:   BRAULIO REED

 

CARDIAC CATHETERIZATION REPORT WITH PERIPHERAL ANGIOGRAM 

 

DATE OF PROCEDURE:  

01/25/2017

 

REFERRING PHYSICIAN: 

Dr. Hardy 

 

BRIEF HISTORY:

Mrs. Rizzo is a 46-year-old lady with history of peripheral

arterial disease.  She had subclavian stenosis and a stent placed

in her left subclavian artery. She has been having lower

extremity pain. Had an abnormal VALARIE is 0.77 on the right and 0.58

on the left. The patient had an abnormal stress test with breast

attenuation and decreased uptake at the mid to apical anterior

wall, with subtle reversibility. She was scheduled for left heart

catheterization, possible PTCA and peripheral angiogram. 

 

PROCEDURE NOTE:

After explaining the procedure to the patient, all pros and cons

were explained. All questions were answered. The patient signed a

consent, then she was placed on the cardiac catheterization

laboratory. The right groin was prepped in a sterile fashion.

Local anesthesia applied to the right groin. 6-[place] sheath was

placed in the right femoral artery. Combination of right and left

David catheter were used to access the right and left coronary

system. Multiple views were used. Pigtail catheter advanced to

the left ventricular cavity. Pressure was measured. No left

ventriculogram was done. Pullback LV to aorta was done. The

aortic arch angiogram was done. Then I pulled pigtail catheter to

the abdominal aorta an abdominal aortogram with runoff to the

lower extremity was done. At that point, the patient was noted to

have total occlusion of the left iliac artery, common iliac

artery. I attempted to cross over from using an IM guide without

success. I proceeded with placement of a sheath on the left

groin. After placement of the sheet I used IMC, I placed it at

the ostium of occlusion then I was able to advance a glidewire

across the lesion. Proceeded with balloon dilatation using 5.0 x

40 mm balloon, [place] 35 balloon without any complication.

Multiple inflations were made.  Then after that through the left

sheath I was able to advance a Storq wire easily without

difficulties, proceed with placement of the pigtail catheter in

the abdominal aorta.  Did angiogram again, then proceeded with

stent deployment using Omnilink elite 7 x 59 x 80 metal stent

expanded over a balloon at the common femoral area. It appears

that the artery is slightly larger. I reintroduced the [place]

balloon 8 mm and expanded the distal portion of the stent without

complication. Angiogram showed excellent result. Pressure was

measured at the left groin prior to the procedure and after the

procedure showing significant improvement. At that point both

sheaths were removed. Mynx device deployed. Hemostasis achieved.

 

 

HEMODYNAMICS: 

The left ventricle pressure is 111/14, end-diastolic pressure 14,

aortic pressure 111/60, mean of 80 no significant gradient across

the aortic valve. 

 

ANATOMY:   

1.   Left main coronary artery is bifurcating to left anterior

descending and left circumflex artery with no obstructive

disease. 

2.   Left anterior descending artery is moderate in size with

mild irregularity. No significant obstructive disease. 

3.   Left circumflex artery is moderate in size, 60% ostial left

circumflex artery stenosis. It appeared to be moderate. 

4.   Right coronary artery is moderate in size with mild disease,

nonobstructive disease. 

5.   Aortic arch angiogram: Aortic arch was done in the left

anterior oblique position.  The aortic arch is normal in size.

Origin of the carotid artery, innominate artery and left

subclavian artery appeared patent, the stent in the left

subclavian artery is patent. 

6.   Abdominal aortogram with bilateral runoff:  Abdominal

aortogram was done with  automatic injection with bilateral

runoff of the lower extremities. The abdominal aorta is normal in

size. There is mild disease on the right side, down to the

trifurcation; the flow was brisk. There is total occlusion on the

left side at the left common iliac artery. 

 

PERCUTANEOUS INTERVENTION:  

The patient had total occlusion of the left common iliac artery.

Successful complex intervention, then deployment of Omnilink

Elite bare-metal stent 7 x 59 x 80 with restoration of the flow

with expanded at the common iliac area to 7 mm and distally at

the common femoral area to 8 mm with excellent results. Pressure

gradient returned to normal immediately after the intervention. 

 

CONCLUSION:

1.   Total occlusion of the left common iliac artery, status post

percutaneous intervention, then stent deployment using Omnilink

Elite 7.0 x 59 mm bare metal stent, expanded to 7 mm at the iliac

area and 8 mm the femoral area with excellent results. 

2.   Patent stent in the left subclavian artery with no

obstructive disease. 

3.   60% ostial left circumflex artery stenosis, nonobstructive

disease, mild to moderate disease in the LAD and right coronary

artery. 

 

DISCUSSION AND RECOMMENDATION:  

Mrs. Rizzo had ischemia in the anterior wall does not match 60%

ostial circumflex lesion. Medical therapy is recommended. I will

monitor her closely and consider reevaluation in one year.

Possible FFR to that area. Continue with aspirin and Plavix,

encourage smoking cessation and started on Lipitor and fish oil.

Encourage compliance with medication. 

 

FINAL DIAGNOSIS:  

1.   Coronary artery disease. 

2.   Peripheral arterial disease. 

3.   Hyperlipidemia. 

4.   Gangrenous finger.

 

 

Job ID: [id number] 

Dictated Date: 01/25/2017 12:16:19 

Transcription Date: 01/26/2017 10:37:56/maddi

## 2017-08-22 ENCOUNTER — HOSPITAL ENCOUNTER (OUTPATIENT)
Dept: HOSPITAL 75 - RAD | Age: 47
End: 2017-08-22
Attending: INTERNAL MEDICINE
Payer: COMMERCIAL

## 2017-08-22 DIAGNOSIS — J43.9: ICD-10-CM

## 2017-08-22 DIAGNOSIS — Z95.828: ICD-10-CM

## 2017-08-22 DIAGNOSIS — I65.21: Primary | ICD-10-CM

## 2017-08-22 PROCEDURE — 70498 CT ANGIOGRAPHY NECK: CPT

## 2017-08-22 NOTE — DIAGNOSTIC IMAGING REPORT
CLINICAL INDICATION: Patient with bilateral carotid stenosis.

Patient states no issues. Patient has a stent under left clavicle

area.



EXAM: CT angiogram of the neck/carotids performed with 80 cc of

Omnipaque 350 IV contrast. Coronal and sagittal MIP images were

created to better evaluate anatomy.



COMPARISON: None.



FINDINGS:

Streak artifact from patient body habitus partially obscures the

aortic arch and proximal great vessels. There is motion artifact

with duplication appearance of the stent within the proximal left

subclavian artery which greatly limits evaluation. Adjacent

streak artifact also greatly limits evaluation. There is contrast

seen within the stent which is grossly patent. There is contrast

within the cervical left vertebral artery and left subclavian

artery. Again, the streak and motion artifact limits evaluation

for subtle areas of stenosis. There is atherosclerotic plaque

involving the origin of the left common carotid artery with no

significant stenosis. Three-vessel aortic arch is seen. There is

mild narrowing of the origin of the left cervical vertebral

artery. Relatively dominant left vertebral artery is seen. Both

cervical vertebral arteries are patent. There is noncalcified

atherosclerotic plaque involving the origin of the right common

carotid artery causing mild stenosis. There is also a partially

calcified atherosclerotic plaque involving the distal right CCA,

proximal right ECA, and proximal right cervical ICA. There is

complete occlusion of the proximal cervical right ICA at its

origin. There is reconstitution of the cavernous right carotid

artery at the level of the ophthalmic artery. There is also a

prominent anterior communicating artery and right A1 ANTHONY which

may also be contributing to reconstitution. There is very small

caliber of the right cavernous carotid artery to its supraclinoid

region. Remainder of the visualized [place] is

unremarkable. The origin of the right ECA is severely narrowed

and near occlusion with string sign seen. There is contrast

opacification of the distal branches of the left ECA. There is

atherosclerotic disease of the origins of the left ECA and ICA

which show mild narrowing.



There is multiple calcified nodules within the visualized upper

lung fields. There is significant emphysematous lung disease.

There are curvilinear opacities, patchy areas of consolidation,

and parenchymal band seen within both upper lobes. There is mild

prominence of the posterior lingual adenoid soft tissue which may

be reactive. Otherwise, the neck soft tissue structures show no

significant abnormality. There is no lymphadenopathy. There is

suggestion of diffuse disc bulge at the C5-C6 level with

posterior disc spurs which cause at least mild central canal

narrowing and at least mild to moderate left neural foramen

narrowing. Visualized portion of the intracranial structures is

unremarkable.



IMPRESSION:

1: Limited evaluation of the neck vascular structures near the

aortic arch and great vessels due to patient body habitus and

streak artifact.



2: Proximal left subclavian artery stent is seen, but motion

artifact and streak artifact greatly obscures this region. There

is contrast seen within the left subclavian stent and contrast

within the left subclavian vein and left cervical vertebral

artery. Due to the artifact, it is difficult to evaluate for any

degree of more subtle stenosis.



3: There is complete occlusion of the cervical right ICA which is

reconstituted in the cavernous right ICA region. Cervical right

ICA may be reconstituted from the right ophthalmic artery and/or

prominent anterior communicating artery. The [place]

vessels are otherwise patent with no significant stenosis.



4: There is near complete occlusion of the origin of the right

ECA.



5: There is mild stenosis of the origin of the cervical left ICA,

proximal left ECA, origin of the cervical left vertebral artery,

and origin of the right common carotid artery.



6:  Significant emphysematous lung disease with patchy areas of

consolidation and suspected scarring with partially calcified

nodules. Chest CT scan with contrast would help better evaluate

to exclude an underlying mass.



Dictated by: 



  Dictated on workstation # [id number]

## 2017-08-30 ENCOUNTER — HOSPITAL ENCOUNTER (OUTPATIENT)
Dept: HOSPITAL 75 - RAD | Age: 47
End: 2017-08-30
Attending: FAMILY MEDICINE
Payer: COMMERCIAL

## 2017-08-30 DIAGNOSIS — R91.8: Primary | ICD-10-CM

## 2017-08-30 PROCEDURE — 71260 CT THORAX DX C+: CPT

## 2017-08-30 NOTE — DIAGNOSTIC IMAGING REPORT
PROCEDURE: CT chest with contrast only.



TECHNIQUE: Multiple contiguous axial images were obtained through

the chest after administration of intravenous contrast.



INDICATION: Abnormal CT examination.



FINDINGS: The recent CTA neck exam of 08/22/2017 noted

significant emphysematous lung disease with patchy areas of

consolidation and suspected scarring in both lung apices. There

were also partially calcified nodules. Those findings are again

evident on this study and do not seem to have changed

significantly. Furthermore, in reviewing the previous CT chest

exam of 11/22/2011, I feel that the areas of abnormal parenchymal

density are also stable when compared to that exam. There are

also small subcentimeter nodular densities in both lungs, and

these seem similar to the prior exam as well.



The emphysematous changes and the scar formation involving both

lungs seen on the prior study do not appear to have progressed

significantly. There is no sign of failure, pneumonia, or pleural

effusion to indicate an acute abnormality.



The heart size is stable when compared to the prior study and

within normal limits. The aorta is not abnormally dilated, and

there is no sign of a dissection. The pulmonary arteries were not

well opacified and consequently difficult to evaluate for a

pulmonary embolus. There is no obvious defect to suggest a

pulmonary embolus.



There is no mediastinal or hilar adenopathy.



The thyroid gland were visualized is unremarkable.



There is no evidence of an obvious breast mass. According to our

records, the patient has not had a mammogram since 06/12/2015. If

the patient has had a recent (within the last year) mammogram

elsewhere, then no additional imaging would be recommended.

However, if the patient has not had a recent mammogram, then

mammography would be recommended.



The sections through the upper abdomen fail to show any sign of

an acute abnormality.



The bone windows are unremarkable for a fracture or for a

destructive lesion.



IMPRESSION:

1. There are chronic pulmonary changes involving both lungs,

particularly the lung apices. There are also nodules in each

lung. These findings do seem similar to the prior exam of

11/22/2011. There is no evidence for a neoplastic mass, and there

is no sign of an acute cardiopulmonary abnormality.

2. There is no obvious breast mass. Recommendations as above.



Dictated by: 



  Dictated on workstation # [id number]

## 2017-09-18 ENCOUNTER — HOSPITAL ENCOUNTER (OUTPATIENT)
Dept: HOSPITAL 75 - RAD | Age: 47
End: 2017-09-18
Attending: FAMILY MEDICINE
Payer: COMMERCIAL

## 2017-09-18 DIAGNOSIS — Z12.31: Primary | ICD-10-CM

## 2017-09-18 PROCEDURE — 77067 SCR MAMMO BI INCL CAD: CPT

## 2017-09-19 NOTE — DIAGNOSTIC IMAGING REPORT
EXAMINATION:

Bilateral screening mammogram 2D views with tomosynthesis. The

current study was also evaluated with a Computer Aided Detection

(CAD) system.



INDICATION: 

Screening.



PERSONAL HISTORY: 

No current complaints stated on the questionnaire.



COMPARISON: 

06/17/2015.



FINDINGS:

There are scattered fibroglandular densities. There is an

asymmetry with question of architectural distortion seen in the

posterior aspect on the left MLO view just above the nipple line

level which persist on the tomographic evaluation. The right

breast demonstrates no definite change.



IMPRESSION: 

Focal compression view and ultrasound evaluation for an asymmetry

in the posterior slightly superior aspect of the left MLO view

would be recommended. 



ACR BI-RADS Category 0: Incomplete. (Needs additional imaging

evaluation).

Result letter will be mailed to the patient.

Note: At least 10% of breast cancer is not imaged by mammography.



Dictated by: 



  Dictated on workstation # [id number]

## 2017-09-28 ENCOUNTER — HOSPITAL ENCOUNTER (OUTPATIENT)
Dept: HOSPITAL 75 - RAD | Age: 47
End: 2017-09-28
Attending: FAMILY MEDICINE
Payer: COMMERCIAL

## 2017-09-28 DIAGNOSIS — R92.8: Primary | ICD-10-CM

## 2017-09-28 PROCEDURE — 76642 ULTRASOUND BREAST LIMITED: CPT

## 2017-09-28 NOTE — DIAGNOSTIC IMAGING REPORT
EXAMINATION: Left breast diagnostic mammogram with tomography.



The current study was also evaluated with a Computer Aided

Detection (CAD) system.



COMPARISON: 9/18/17.



INDICATION: Asymmetry was seen along the posterior central aspect

of the left MLO view.



FINDINGS/IMPRESSION: The area of asymmetry was evaluated with

focal compression views with no definite underlying lesion seen

in favor of summation artifact of parenchyma. Ultrasound

evaluation pending.



ACR BI-RADS Category 0: Incomplete. (Needs additional imaging

evaluation).

Result letter will be mailed to the patient.

Note: At least 10% of breast cancer is not imaged by mammography.



Dictated by: 



  Dictated on workstation # [id number]

## 2017-09-28 NOTE — DIAGNOSTIC IMAGING REPORT
EXAMINATION: Left breast ultrasound.



INDICATION: Asymmetry along the posterior superior aspect of the

left breast.



FINDINGS: Unremarkable breast parenchyma is seen with no focal

lesion.



IMPRESSION: Negative study. Annual screening mammogram is

recommended. 



ACR BI-RADS Category 1: Negative.

Result letter will be mailed to the patient.

Note:  At least 10% of breast cancer is not imaged by

mammography.



Dictated by: 



  Dictated on workstation # [id number]

## 2018-11-20 ENCOUNTER — HOSPITAL ENCOUNTER (OUTPATIENT)
Dept: HOSPITAL 75 - RAD | Age: 48
End: 2018-11-20
Attending: FAMILY MEDICINE
Payer: COMMERCIAL

## 2018-11-20 DIAGNOSIS — Z12.31: Primary | ICD-10-CM

## 2018-11-20 PROCEDURE — 77067 SCR MAMMO BI INCL CAD: CPT

## 2018-12-13 ENCOUNTER — HOSPITAL ENCOUNTER (OUTPATIENT)
Dept: HOSPITAL 75 - RAD | Age: 48
End: 2018-12-13
Attending: FAMILY MEDICINE
Payer: COMMERCIAL

## 2018-12-13 DIAGNOSIS — R92.2: Primary | ICD-10-CM

## 2018-12-13 NOTE — DIAGNOSTIC IMAGING REPORT
INDICATION: 

Abnormal screening mammogram.



COMPARISON:  

11/23/2018 and 09/19/2017.



TECHNIQUE: 

A true lateral view and spot compression views of the right

breast were obtained. The current study was also evaluated with a

Computer Aided Detection (CAD) system.



FINDINGS:

The previously described density appears to be attributable to

superimposed fibroglandular tissue. There is no discrete mass,

spiculated lesion, or suspicious calcification identified.



IMPRESSION:   

Benign findings.



ACR BI-RADS Category 2: Benign findings.

Result letter will be mailed to the patient.

Note: At least 10% of breast cancer is not imaged by mammography.



Dictated by: 



  Dictated on workstation # [id number]

## 2019-02-16 ENCOUNTER — HOSPITAL ENCOUNTER (EMERGENCY)
Dept: HOSPITAL 75 - ER | Age: 49
Discharge: HOME | End: 2019-02-16
Payer: COMMERCIAL

## 2019-02-16 VITALS — BODY MASS INDEX: 31.89 KG/M2 | WEIGHT: 180 LBS | HEIGHT: 63 IN

## 2019-02-16 VITALS — SYSTOLIC BLOOD PRESSURE: 135 MMHG | DIASTOLIC BLOOD PRESSURE: 73 MMHG

## 2019-02-16 DIAGNOSIS — Z98.890: ICD-10-CM

## 2019-02-16 DIAGNOSIS — Z88.5: ICD-10-CM

## 2019-02-16 DIAGNOSIS — R07.89: ICD-10-CM

## 2019-02-16 DIAGNOSIS — Z79.02: ICD-10-CM

## 2019-02-16 DIAGNOSIS — Z88.8: ICD-10-CM

## 2019-02-16 DIAGNOSIS — Z88.1: ICD-10-CM

## 2019-02-16 DIAGNOSIS — Z88.2: ICD-10-CM

## 2019-02-16 DIAGNOSIS — Z90.89: ICD-10-CM

## 2019-02-16 DIAGNOSIS — Z98.51: ICD-10-CM

## 2019-02-16 DIAGNOSIS — Z79.82: ICD-10-CM

## 2019-02-16 DIAGNOSIS — I25.10: ICD-10-CM

## 2019-02-16 DIAGNOSIS — J44.9: ICD-10-CM

## 2019-02-16 DIAGNOSIS — Z87.891: ICD-10-CM

## 2019-02-16 DIAGNOSIS — I73.9: ICD-10-CM

## 2019-02-16 DIAGNOSIS — M54.2: Primary | ICD-10-CM

## 2019-02-16 DIAGNOSIS — M25.512: ICD-10-CM

## 2019-02-16 LAB
ALBUMIN SERPL-MCNC: 4.4 GM/DL (ref 3.2–4.5)
ALP SERPL-CCNC: 54 U/L (ref 40–136)
ALT SERPL-CCNC: 16 U/L (ref 0–55)
APTT BLD: 25 SEC (ref 24–35)
BASOPHILS # BLD AUTO: 0.1 10^3/UL (ref 0–0.1)
BASOPHILS NFR BLD AUTO: 1 % (ref 0–10)
BILIRUB SERPL-MCNC: 0.4 MG/DL (ref 0.1–1)
BUN/CREAT SERPL: 18
CALCIUM SERPL-MCNC: 9.2 MG/DL (ref 8.5–10.1)
CHLORIDE SERPL-SCNC: 106 MMOL/L (ref 98–107)
CO2 SERPL-SCNC: 21 MMOL/L (ref 21–32)
CREAT SERPL-MCNC: 0.82 MG/DL (ref 0.6–1.3)
D DIMER PPP FEU-MCNC: <= 0.27 UG/ML (ref 0–0.49)
EOSINOPHIL # BLD AUTO: 0.2 10^3/UL (ref 0–0.3)
EOSINOPHIL NFR BLD AUTO: 2 % (ref 0–10)
ERYTHROCYTE [DISTWIDTH] IN BLOOD BY AUTOMATED COUNT: 13.5 % (ref 10–14.5)
GFR SERPLBLD BASED ON 1.73 SQ M-ARVRAT: > 60 ML/MIN
GLUCOSE SERPL-MCNC: 102 MG/DL (ref 70–105)
HCT VFR BLD CALC: 41 % (ref 35–52)
HGB BLD-MCNC: 12.9 G/DL (ref 11.5–16)
INR PPP: 0.9 (ref 0.8–1.4)
LYMPHOCYTES # BLD AUTO: 2.6 X 10^3 (ref 1–4)
LYMPHOCYTES NFR BLD AUTO: 24 % (ref 12–44)
MAGNESIUM SERPL-MCNC: 2.1 MG/DL (ref 1.8–2.4)
MANUAL DIFFERENTIAL PERFORMED BLD QL: NO
MCH RBC QN AUTO: 30 PG (ref 25–34)
MCHC RBC AUTO-ENTMCNC: 32 G/DL (ref 32–36)
MCV RBC AUTO: 96 FL (ref 80–99)
MONOCYTES # BLD AUTO: 0.6 X 10^3 (ref 0–1)
MONOCYTES NFR BLD AUTO: 5 % (ref 0–12)
MYOGLOBIN SERPL-MCNC: 30.2 NG/ML (ref 10–92)
NEUTROPHILS # BLD AUTO: 7.5 X 10^3 (ref 1.8–7.8)
NEUTROPHILS NFR BLD AUTO: 69 % (ref 42–75)
PLATELET # BLD: 347 10^3/UL (ref 130–400)
PMV BLD AUTO: 11 FL (ref 7.4–10.4)
POTASSIUM SERPL-SCNC: 3.7 MMOL/L (ref 3.6–5)
PROT SERPL-MCNC: 7.3 GM/DL (ref 6.4–8.2)
PROTHROMBIN TIME: 12.6 SEC (ref 12.2–14.7)
SODIUM SERPL-SCNC: 138 MMOL/L (ref 135–145)
WBC # BLD AUTO: 10.9 10^3/UL (ref 4.3–11)

## 2019-02-16 PROCEDURE — 85379 FIBRIN DEGRADATION QUANT: CPT

## 2019-02-16 PROCEDURE — 36415 COLL VENOUS BLD VENIPUNCTURE: CPT

## 2019-02-16 PROCEDURE — 96374 THER/PROPH/DIAG INJ IV PUSH: CPT

## 2019-02-16 PROCEDURE — 85610 PROTHROMBIN TIME: CPT

## 2019-02-16 PROCEDURE — 93005 ELECTROCARDIOGRAM TRACING: CPT

## 2019-02-16 PROCEDURE — 84484 ASSAY OF TROPONIN QUANT: CPT

## 2019-02-16 PROCEDURE — 71045 X-RAY EXAM CHEST 1 VIEW: CPT

## 2019-02-16 PROCEDURE — 85025 COMPLETE CBC W/AUTO DIFF WBC: CPT

## 2019-02-16 PROCEDURE — 83874 ASSAY OF MYOGLOBIN: CPT

## 2019-02-16 PROCEDURE — 83735 ASSAY OF MAGNESIUM: CPT

## 2019-02-16 PROCEDURE — 93041 RHYTHM ECG TRACING: CPT

## 2019-02-16 PROCEDURE — 85730 THROMBOPLASTIN TIME PARTIAL: CPT

## 2019-02-16 PROCEDURE — 80053 COMPREHEN METABOLIC PANEL: CPT

## 2019-02-16 NOTE — XMS REPORT
Continuity of Care Document

 Created on: 2019



TANIA WILLIAM

External Reference #: [id number]

: 1970

Sex: Female



Demographics







 Address  [address] [place]  [address]

 

 Home Phone  [phone]

 

 Preferred Language  Unknown

 

 Marital Status  Unknown

 

 Gnosticism Affiliation  Unknown

 

 Race  Unknown

 

 Ethnic Group  Unknown





Author







 Author  [facility]

 

 Organization  [facility]

 

 Address  Unknown

 

 Phone  Unavailable



              



Allergies

      





 Active            Description            Code            Type            
Severity            Reaction            Onset            Reported/Identified   
         Relationship to Patient            Clinical Status        

 

 Yes            codeine            [id number]            Drug Allergy          
  Unknown            N/A                         2007                    
              

 

 Yes            Sulfa (Sulfonamide Antibiotics)            [id number]          
  Drug Allergy            Unknown            N/A                         2007                                  

 

 Yes            acetaminophen            [id number]            Drug Allergy    
        Severe            SOA, RASH                         11/10/2011         
                         

 

 Yes            Erythromycin Lactobionate            [id number]            Drug 
Allergy            Severe            SOA, RASH                         11/10/
2011                                  

 

 Yes            methocarbamol            [id number]            Drug Allergy    
        Severe            SOA, RASH                         11/10/2011         
                         

 

 Yes            oxycodone HCl            [id number]            Drug Allergy    
        Severe            SOA, RASH                         11/10/2011         
                         



                            



Medications

      



There is no data.                  



Problems

      





 Date Dx Coded            Attending            Type            Code            
Diagnosis            Diagnosed By        

 

             DEEPAK LEE            Ot            I73.00    
        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                     

 

             DEEPAK LEE            Ot            S67.42XA  
          CRUSHING INJURY OF LEFT WRIST AND HAND,                      

 

             DEEPAK LEE            Ot            X58.XXXA  
          EXPOSURE TO OTHER SPECIFIED FACTORS, INI                     

 

             DEEPAK LEE            Ot            Y99.8     
       OTHER EXTERNAL CAUSE STATUS                     

 

 1599            DEEPAK LEE            Ot            I73.9    
        PERIPHERAL VASCULAR DISEASE, UNSPECIFIED                     

 

 1599            DEEPAK LEE            Ot            S67.42XD 
           CRUSHING INJURY OF LEFT WRIST AND HAND,                      

 

 11/10/2011                         Ot            786.50            CHEST PAIN 
NOS                     

 

 11/10/2011                         Ot            786.52            PAINFUL 
RESPIRATION                     

 

 2015                         Ot            786.05                       
           

 

 2015                         Ot            793.19                       
           

 

 2015                         Ot            496                          
        

 

 2015                         Ot            793.82                       
           

 

 2015                         Ot            V76.12                       
           

 

 2015                         Ot            793.80                       
           

 

 2015            ANDREW EWING DO            Ot            626.2  
                                

 

 2015            GELLENDER DO, ANDREW GRIMM            Ot            783.21 
                                 

 

 2015            GELLENDER DO, ANDREW GRIMM            Ot            626.2  
                                

 

 2015            GELLENDER DO, ANDREW GRIMM            Ot            783.21 
                                 

 

 2015            GELLENDER DO, ANDREW GRIMM            Ot            626.2  
                                

 

 2015            GELLENDER DO, ANDREW GRIMM            Ot            V76.12 
                                 

 

 2015            GELLENDER DO, ANDREW GRIMM            Ot            V76.12 
                                 

 

 2016                         Ot            F17.210            NICOTINE 
DEPENDENCE, CIGARETTES, UNCOMPL                     

 

 2016                         Ot            I73.00            RAYNAUD'S 
SYNDROME WITHOUT GANGRENE                     

 

 2016            GELLENDER DO, ANDREW GRIMM            Ot            I73.00 
           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                     

 

 2016            GELLENDER DO, ANDREW GRIMM            Ot            I73.00 
           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                     

 

 2016            GELLENDER DO, ANDREW GRIMM            Ot            I73.00 
           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                     

 

 2016            GELLENDER DO, ANDREW GRIMM            Ot            
S69.92XA            UNSP INJURY OF LEFT WRIST, HAND AND FING                   
  

 

 2016            GELLENDER DO, ANDREW GRIMM            Ot            
X58.XXXA            EXPOSURE TO OTHER SPECIFIED FACTORS, INI                   
  

 

 2016            GELLENDER DO, ANDREW GRIMM            Ot            Y99.8  
          OTHER EXTERNAL CAUSE STATUS                     

 

 2016            GELLENDER DO, ANDREW GRIMM            Ot            
S69.92XA            UNSP INJURY OF LEFT WRIST, HAND AND FING                   
  

 

 2016            GELLENDER DO, ANDREW GRIMM            Ot            
X58.XXXA            EXPOSURE TO OTHER SPECIFIED FACTORS, INI                   
  

 

 2016            GELLENDER DO, ANDREW GRIMM            Ot            Y99.8  
          OTHER EXTERNAL CAUSE STATUS                     

 

 08/10/2016            GELLENDER DO, ANDREW GRIMM            Ot            I73.00 
           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                     

 

 08/10/2016            GELLENDER DO, ANDREW GRIMM            Ot            
S69.92XA            UNSP INJURY OF LEFT WRIST, HAND AND FING                   
  

 

 08/10/2016            GELLENDER DO, ANDREW GRIMM            Ot            
X58.XXXA            EXPOSURE TO OTHER SPECIFIED FACTORS, INI                   
  

 

 08/10/2016            GELLENDER DO, ANDREW GRIMM            Ot            Y99.8  
          OTHER EXTERNAL CAUSE STATUS                     

 

 2016            GELLENDER DO, ANDREW GRIMM            Ot            I73.00 
           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                     

 

 2016            GELLENDER DO, ANDREW GRIMM            Ot            
S69.92XA            UNSP INJURY OF LEFT WRIST, HAND AND FING                   
  

 

 2016            GELLENDER DO, ANDREW GRIMM            Ot            
X58.XXXA            EXPOSURE TO OTHER SPECIFIED FACTORS, INI                   
  

 

 2016            GELLENDER DO, ANDREW GRIMM            Ot            Y99.8  
          OTHER EXTERNAL CAUSE STATUS                     

 

 2016            DEEPAK LEE            Ot            I73.00   
         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                     

 

 2016            DEEPAK LEE APRN            Ot            S67.42XA 
           CRUSHING INJURY OF LEFT WRIST AND HAND,                      

 

 2016            DEEPAK LEE APRN            Ot            X58.XXXA 
           EXPOSURE TO OTHER SPECIFIED FACTORS, INI                     

 

 2016            DEEPAK LEE            Ot            Y99.8    
        OTHER EXTERNAL CAUSE STATUS                     

 

 2016            GELLENDER DO, ANDREW GRIMM            Ot            I73.00 
           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                     

 

 2016            GELLENDER DO, ANDREW GRIMM            Ot            
S69.92XA            UNSP INJURY OF LEFT WRIST, HAND AND FING                   
  

 

 2016            GELLENDER DO, ANDREW GRIMM            Ot            
X58.XXXA            EXPOSURE TO OTHER SPECIFIED FACTORS, INI                   
  

 

 2016            GELLENDER DO, ANDREW GRIMM            Ot            Y99.8  
          OTHER EXTERNAL CAUSE STATUS                     

 

 2016            DEEPAK LEE            Ot            I73.00   
         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                     

 

 2016            DEEPAK LEE APRN            Ot            S67.42XA 
           CRUSHING INJURY OF LEFT WRIST AND HAND,                      

 

 2016            DEEPAK LEE            Ot            X58.XXXA 
           EXPOSURE TO OTHER SPECIFIED FACTORS, INI                     

 

 2016            DEEPAK LEE APRN            Ot            Y99.8    
        OTHER EXTERNAL CAUSE STATUS                     

 

 2016            GELLENDER DO, ANDREW GRIMM            Ot            I73.00 
           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                     

 

 2016            GELLENDER DO, ANDREW GRIMM            Ot            
S69.92XA            UNSP INJURY OF LEFT WRIST, HAND AND FING                   
  

 

 2016            GELLENDER DO, ANDREW GRIMM            Ot            
X58.XXXA            EXPOSURE TO OTHER SPECIFIED FACTORS, INI                   
  

 

 2016            GELLENDER DO, ANDREW GRIMM            Ot            Y99.8  
          OTHER EXTERNAL CAUSE STATUS                     

 

 2016            DEEPAK LEE            Ot            I73.00   
         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                     

 

 2016            DEEPAK LEE            Ot            S67.42XA 
           CRUSHING INJURY OF LEFT WRIST AND HAND,                      

 

 2016            DEEPAK LEE            Ot            X58.XXXA 
           EXPOSURE TO OTHER SPECIFIED FACTORS, INI                     

 

 2016            DEEPAK LEE            Ot            Y99.8    
        OTHER EXTERNAL CAUSE STATUS                     

 

 2016            DEEPAK LEE            Ot            I73.00   
         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                     

 

 2016            DEEPAK LEE            Ot            S67.42XD 
           CRUSHING INJURY OF LEFT WRIST AND HAND,                      

 

 2016            BRAULIO REED MD            Ot            I10         
   ESSENTIAL (PRIMARY) HYPERTENSION                     

 

 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     
       UNSP ATHSCL NATIVE ARTERIES OF EXTREMITI                     

 

 2016            BRAULIO REED MD            Ot            I70.8       
     ATHEROSCLEROSIS OF OTHER ARTERIES                     

 

 2016            BRAULIO REED MD            Ot            I70.92      
      CHRONIC TOTAL OCCLUSION OF ARTERY OF THE                     

 

 2016            BRAULIO REED MD            Ot            J44.9       
     CHRONIC OBSTRUCTIVE PULMONARY DISEASE, U                     

 

 2016            BRAULIO REED MD            Ot            L98.499     
       NON-PRESSURE CHRONIC ULCER OF SKIN OF SI                     

 

 2016            BRAULIO REED MD            Ot            Z72.0       
     TOBACCO USE                     

 

 2016            BRAULIO REED MD            Ot            Z79.899     
       OTHER LONG TERM (CURRENT) DRUG THERAPY                     

 

 2016            DEEPAK LEE            Ot            I73.00   
         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                     

 

 2016            DEEPAK LEE            Ot            S67.42XD 
           CRUSHING INJURY OF LEFT WRIST AND HAND,                      

 

 2016            ANDREW EWING DO            Ot            I73.00 
           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                     

 

 2016            ANDREW EWING DO            Ot            
S69.92XA            UNSP INJURY OF LEFT WRIST, HAND AND FING                   
  

 

 2016            ANDREW EWING DO            Ot            
X58.XXXA            EXPOSURE TO OTHER SPECIFIED FACTORS, INI                   
  

 

 2016            ANDREW EWING DO            Ot            Y99.8  
          OTHER EXTERNAL CAUSE STATUS                     

 

 2016            ROSA, DEEPAK R APRN            Ot            I73.00   
         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                     

 

 2016            GRISELDA ELEARELY ARCHIBALD APRN            Ot            S67.42XA 
           CRUSHING INJURY OF LEFT WRIST AND HAND,                      

 

 2016            ROSA DEEPAK ARCHIBALD APRN            Ot            X58.XXXA 
           EXPOSURE TO OTHER SPECIFIED FACTORS, INI                     

 

 2016            ROSA DEEPAK ARCHIBALD APRN            Ot            Y99.8    
        OTHER EXTERNAL CAUSE STATUS                     

 

 2016            ROSA DEEPAK ARCHIBALD APRN            Ot            I73.00   
         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                     

 

 2016            ROSA DEEPAK ARCHIBALD APRN            Ot            S67.42XD 
           CRUSHING INJURY OF LEFT WRIST AND HAND,                      

 

 2016            ROSA DEEPAK ARCHIBALD APRN            Ot            I73.00   
         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                     

 

 2016            ROSA DEEPAK ARCHIBALD APRN            Ot            S67.42XA 
           CRUSHING INJURY OF LEFT WRIST AND HAND,                      

 

 2016            ROSA EDEPAK ARCHIBALD APRARELY            Ot            X58.XXXA 
           EXPOSURE TO OTHER SPECIFIED FACTORS, INI                     

 

 2016            DEEPAK LEE APRN            Ot            Y99.8    
        OTHER EXTERNAL CAUSE STATUS                     

 

 2016            BRAULIO REED MD            Ot            I10         
   ESSENTIAL (PRIMARY) HYPERTENSION                     

 

 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                     

 

 2016            BRAULIO REED MD            Ot            I70.8       
     ATHEROSCLEROSIS OF OTHER ARTERIES                     

 

 2016            BRAULIO REED MD            Ot            I70.92      
      CHRONIC TOTAL OCCLUSION OF ARTERY OF THE                     

 

 2016            BRAULIO REED MD            Ot            J44.9       
     CHRONIC OBSTRUCTIVE PULMONARY DISEASE, U                     

 

 2016            BRAULIO REED MD            Ot            L98.499     
       NON-PRESSURE CHRONIC ULCER OF SKIN OF SI                     

 

 2016            BRAULIO REED MD            Ot            Z72.0       
     TOBACCO USE                     

 

 2016            BRAULIO REED MD            Ot            Z79.899     
       OTHER LONG TERM (CURRENT) DRUG THERAPY                     

 

 2016            BRAULIO REED MD            Ot            I10         
   ESSENTIAL (PRIMARY) HYPERTENSION                     

 

 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                     

 

 2016            BRAULIO REED MD            Ot            I70.8       
     ATHEROSCLEROSIS OF OTHER ARTERIES                     

 

 2016            BRAULIO REED MD            Ot            I70.92      
      CHRONIC TOTAL OCCLUSION OF ARTERY OF THE                     

 

 2016            BRAULIO REED MD, Ot            J44.9       
     CHRONIC OBSTRUCTIVE PULMONARY DISEASE, U                     

 

 2016            BRAULIO REED MD            Ot            L98.499     
       NON-PRESSURE CHRONIC ULCER OF SKIN OF SI                     

 

 2016            BRAULIO REED MD            Ot            Z72.0       
     TOBACCO USE                     

 

 2016            BRAULIO REED MD            Ot            Z79.899     
       OTHER LONG TERM (CURRENT) DRUG THERAPY                     

 

 10/03/2016            ANDREW EWING DO            Ot            I73.00 
           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                     

 

 10/03/2016            ANDREW EWING DO            Ot            
S69.92XA            UNSP INJURY OF LEFT WRIST, HAND AND FING                   
  

 

 10/03/2016            MARCELINADENIA ANDREW CALDWELL            Ot            
X58.XXXA            EXPOSURE TO OTHER SPECIFIED FACTORS, INI                   
  

 

 10/03/2016            ANDREW EWING DO            Ot            Y99.8  
          OTHER EXTERNAL CAUSE STATUS                     

 

 10/03/2016            DEEPAK LEE            Ot            I73.00   
         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                     

 

 10/03/2016            DEEPAK LEE            Ot            S67.42XA 
           CRUSHING INJURY OF LEFT WRIST AND HAND,                      

 

 10/03/2016            DEEPAK LEE            Ot            X58.XXXA 
           EXPOSURE TO OTHER SPECIFIED FACTORS, INI                     

 

 10/03/2016            DEEPAK LEE            Ot            Y99.8    
        OTHER EXTERNAL CAUSE STATUS                     

 

 10/03/2016            DEEPAK LEE            Ot            I73.00   
         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                     

 

 10/03/2016            DEEPAK LEE            Ot            S67.42XD 
           CRUSHING INJURY OF LEFT WRIST AND HAND,                      

 

 10/03/2016            DEEPAK LEE            Ot            I73.00   
         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                     

 

 10/03/2016            DEEPAK LEE            Ot            S67.42XA 
           CRUSHING INJURY OF LEFT WRIST AND HAND,                      

 

 10/03/2016            DEEPAK LEE            Ot            X58.XXXA 
           EXPOSURE TO OTHER SPECIFIED FACTORS, INI                     

 

 10/03/2016            DEEPAK LEE            Ot            Y99.8    
        OTHER EXTERNAL CAUSE STATUS                     

 

 2016                         Ot            786.05            SHORTNESS 
OF BREATH                     

 

 2016                         Ot            793.19            OTHER 
NONSPECIFIC ABNORMAL FINDING OF PACO                     

 

 2016                         Ot            496            CHR AIRWAY 
OBSTRUCT NEC                     

 

 2016                         Ot            793.82            
INCONCLUSIVE MAMMOGRAM                     

 

 2016                         Ot            V76.12            OTH SCREEN 
MAMMO-MALIGN NEOPLASM OF ELLE                     

 

 2016                         Ot            793.80            UNSPEC 
ABNORMAL MAMMOGRAM                     

 

 2016            KAYLIN CALDWELL, ANDREW GRIMM            Ot            626.2  
          EXCESSIVE MENSTRUATION                     

 

 2016            KAYLIN CALDWELL, ANDREW GRIMM            Ot            783.21 
           LOSS OF WEIGHT                     

 

 2016            KAYLIN CALDWELL, ANDREW GRIMM            Ot            626.2  
          EXCESSIVE MENSTRUATION                     

 

 2016            KAYLIN CALDWELL, ANDREW GRIMM            Ot            783.21 
           LOSS OF WEIGHT                     

 

 2016            KAYLIN CALDWELL, ANDREW GRIMM            Ot            626.2  
          EXCESSIVE MENSTRUATION                     

 

 2016            KAYLIN CALDWELL, ANDREW GRIMM            Ot            V76.12 
           OTH SCREEN MAMMO-MALIGN NEOPLASM OF ELLE                     

 

 2016            ANDREW EWING DO            Ot            V76.12 
           OTH SCREEN MAMMO-MALIGN NEOPLASM OF ELLE                     

 

 2016            KAYLIN CALDWELL, ANDREW GRIMM            Ot            
S99.911A            UNSPECIFIED INJURY OF RIGHT ANKLE, INITI                   
  

 

 2016            KAYLIN CALDWELL ANDREW GRIMM            Ot            
W19.XXXA            UNSPECIFIED FALL, INITIAL ENCOUNTER                     

 

 2016            KAYLIN CALDWELL ANDREW GRIMM            Ot            Y92.009
            [facility] PLACE IN [facility] NON-INSTITUT (PRIVATE                     

 

 2016            ANDREW EWING DO            Ot            Y99.8  
          OTHER EXTERNAL CAUSE STATUS                     

 

 2016            DEEPAK LEE            Ot            I73.9    
        PERIPHERAL VASCULAR DISEASE, UNSPECIFIED                     

 

 2016            DEEPAK LEE            Ot            S67.42XD 
           CRUSHING INJURY OF LEFT WRIST AND HAND,                      

 

 2016            DEEPAK LEE            Ot            I73.00   
         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                     

 

 2016            DEEPAK LEE            Ot            S67.42XD 
           CRUSHING INJURY OF LEFT WRIST AND HAND,                      

 

 2016            DEEPAK LEE            Ot            I73.00   
         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                     

 

 2016            DEEPAK LEE            Ot            S67.42XD 
           CRUSHING INJURY OF LEFT WRIST AND HAND,                      

 

 2016            DEEPAK LEE APRN            Ot            I73.9    
        PERIPHERAL VASCULAR DISEASE, UNSPECIFIED                     

 

 2016            GRISELDA LEEARELY ARCHIBALD APRN            Ot            S67.42XD 
           CRUSHING INJURY OF LEFT WRIST AND HAND,                      

 

 2016            [facility]            Ot            
S69.92XA            UNSP INJURY OF LEFT WRIST, HAND AND FING                   
  

 

 2016            [facility]            Ot            
X58.XXXA            EXPOSURE TO OTHER SPECIFIED FACTORS, INI                   
  

 

 2016            KAYLIN CALDWELLANDREW            Ot            Y99.8  
          OTHER EXTERNAL CAUSE STATUS                     

 

 2016            DEEPAK LEE APRN            Ot            I73.9    
        PERIPHERAL VASCULAR DISEASE, UNSPECIFIED                     

 

 2016            DEEPAK LEE APRN            Ot            S67.42XD 
           CRUSHING INJURY OF LEFT WRIST AND HAND,                      

 

 2016            DEEPAK LEE APRN            Ot            I73.9    
        PERIPHERAL VASCULAR DISEASE, UNSPECIFIED                     

 

 2016            ROSA DEEPAK ARCHIBALD APRN            Ot            S67.42XD 
           CRUSHING INJURY OF LEFT WRIST AND HAND,                      

 

 2016            DEEPAK LEE APRN            Ot            I73.9    
        PERIPHERAL VASCULAR DISEASE, UNSPECIFIED                     

 

 2016            GRISELDA LEEARELY ARCHIBALD APRN            Ot            S67.42XD 
           CRUSHING INJURY OF LEFT WRIST AND HAND,                      

 

 2017            KAYLIN ANDREW CALDWELL            Ot            I73.00 
           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                     

 

 2017            [facility]            Ot            
S69.92XA            UNSP INJURY OF LEFT WRIST, HAND AND FING                   
  

 

 2017            [facility]            Ot            
X58.XXXA            EXPOSURE TO OTHER SPECIFIED FACTORS, INI                   
  

 

 2017            [facility]            Ot            Y99.8  
          OTHER EXTERNAL CAUSE STATUS                     

 

 2017            DEEPAK LEE APRN            Ot            I73.00   
         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                     

 

 2017            DEEPAK LEE APRN            Ot            S67.42XD 
           CRUSHING INJURY OF LEFT WRIST AND HAND,                      

 

 2017            BRAULIO REED MD            Ot            I77.1       
     STRICTURE OF ARTERY                     

 

 2017            BRAULIO REED MD            Ot            L98.499     
       NON-PRESSURE CHRONIC ULCER OF SKIN OF SI                     

 

 2017            BRAULIO REED MD            Ot            M35.8       
     OTHER SPECIFIED SYSTEMIC INVOLVEMENT OF                      

 

 2017            BRAULIO REED MD            Ot            Z72.0       
     TOBACCO USE                     

 

 2017            BRAULIO REED MD            Ot            I73.9       
     PERIPHERAL VASCULAR DISEASE, UNSPECIFIED                     

 

 2017            BRAULIO REED MD            Ot            I77.1       
     STRICTURE OF ARTERY                     

 

 2017            BRAULIO REED MD            Ot            L98.499     
       NON-PRESSURE CHRONIC ULCER OF SKIN OF SI                     

 

 2017            BRAULIO REED MD            Ot            M35.8       
     OTHER SPECIFIED SYSTEMIC INVOLVEMENT OF                      

 

 2017            BRAULIO REED MD            Ot            R06.02      
      SHORTNESS OF BREATH                     

 

 2017            BRAULIO REED MD            Ot            Z01.810     
       ENCOUNTER FOR PREPROCEDURAL CARDIOVASCUL                     

 

 2017            BRAULIO REED MD            Ot            Z72.0       
     TOBACCO USE                     

 

 2017            BRAULIO REED MD            Ot            I73.9       
     PERIPHERAL VASCULAR DISEASE, UNSPECIFIED                     

 

 2017            BRAULIO REED MD            Ot            I77.1       
     STRICTURE OF ARTERY                     

 

 2017            BRAULIO REED MD            Ot            L98.499     
       NON-PRESSURE CHRONIC ULCER OF SKIN OF SI                     

 

 2017            BRAULIO REED MD            Ot            M35.8       
     OTHER SPECIFIED SYSTEMIC INVOLVEMENT OF                      

 

 2017            BRAULIO REED MD            Ot            R06.02      
      SHORTNESS OF BREATH                     

 

 2017            BRAULIO REED MD            Ot            Z01.810     
       ENCOUNTER FOR PREPROCEDURAL CARDIOVASCUL                     

 

 2017            BRAULIO REED MD            Ot            Z72.0       
     TOBACCO USE                     

 

 2017            BRAULIO REED MD            Ot            I73.9       
     PERIPHERAL VASCULAR DISEASE, UNSPECIFIED                     

 

 2017            BRAULIO REED MD            Ot            I77.1       
     STRICTURE OF ARTERY                     

 

 2017            BRAULIO REED MD            Ot            L98.499     
       NON-PRESSURE CHRONIC ULCER OF SKIN OF SI                     

 

 2017            BRAULIO REED MD            Ot            M35.8       
     OTHER SPECIFIED SYSTEMIC INVOLVEMENT OF                      

 

 2017            BRAULIO REED MD            Ot            R06.02      
      SHORTNESS OF BREATH                     

 

 2017            BRAULIO REED MD            Ot            Z01.810     
       ENCOUNTER FOR PREPROCEDURAL CARDIOVASCUL                     

 

 2017            BRAULIO REED MD            Ot            Z72.0       
     TOBACCO USE                     

 

 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      
      ATHSCL HEART DISEASE OF NATIVE CORONARY                      

 

 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                     

 

 2017            BRAULIO REED MD            Ot            I70.92      
      CHRONIC TOTAL OCCLUSION OF ARTERY OF THE                     

 

 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      
      RAYNAUD'S SYNDROME WITH GANGRENE                     

 

 2017            BRAULIO REED MD            Ot            L98.499     
       NON-PRESSURE CHRONIC ULCER OF SKIN OF SI                     

 

 2017            BRAULIO REED MD            Ot            R94.39      
      ABNORMAL RESULT OF OTHER CARDIOVASCULAR                      

 

 2017            BRAULIO REED MD            Ot            Z72.0       
     TOBACCO USE                     

 

 2017            BRAULIO REED MD            Ot            Z79.899     
       OTHER LONG TERM (CURRENT) DRUG THERAPY                     

 

 2017            BRAULIO REED MD            Ot            I77.1       
     STRICTURE OF ARTERY                     

 

 2017            BRAULIO REED MD            Ot            L98.499     
       NON-PRESSURE CHRONIC ULCER OF SKIN OF SI                     

 

 2017            BRAULIO REED MD            Ot            M35.8       
     OTHER SPECIFIED SYSTEMIC INVOLVEMENT OF                      

 

 2017            BRAULIO REED MD            Ot            Z72.0       
     TOBACCO USE                     

 

 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      
      ATHSCL HEART DISEASE OF NATIVE CORONARY                      

 

 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     
       [facility]                     

 

 2017            BRAULIO REED MD            Ot            I70.92      
      CHRONIC TOTAL OCCLUSION OF ARTERY OF THE                     

 

 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      
      RAYNAUD'S SYNDROME WITH GANGRENE                     

 

 2017            BRAULIO REED MD            Ot            L98.499     
       NON-PRESSURE CHRONIC ULCER OF SKIN OF SI                     

 

 2017            BRAULIO REED MD            Ot            R94.39      
      ABNORMAL RESULT OF OTHER CARDIOVASCULAR                      

 

 2017            BRAULIO REED MD            Ot            Z72.0       
     TOBACCO USE                     

 

 2017            BRAULIO REED MD            Ot            Z79.899     
       OTHER LONG TERM (CURRENT) DRUG THERAPY                     

 

 2017            BRAULIO REED MD            Ot            I73.9       
     PERIPHERAL VASCULAR DISEASE, UNSPECIFIED                     

 

 2017            BRAULIO REED MD            Ot            I77.1       
     STRICTURE OF ARTERY                     

 

 2017            BRAULIO REED MD            Ot            L98.499     
       NON-PRESSURE CHRONIC ULCER OF SKIN OF SI                     

 

 2017            BRAULIO REED MD            Ot            M35.8       
     OTHER SPECIFIED SYSTEMIC INVOLVEMENT OF                      

 

 2017            BRAULIO REED MD            Ot            R06.02      
      SHORTNESS OF BREATH                     

 

 2017            BRAULIO REED MD            Ot            Z01.810     
       ENCOUNTER FOR PREPROCEDURAL CARDIOVASCUL                     

 

 2017            BRAULIO REED MD            Ot            Z72.0       
     TOBACCO USE                     

 

 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                     

 

 2017            ANDREW EWING DO            Ot            
S69.92XA            UNSP INJURY OF LEFT WRIST, HAND AND FING                   
  

 

 2017            ANDREW EWING DO            Ot            
X58.XXXA            EXPOSURE TO OTHER SPECIFIED FACTORS, INI                   
  

 

 2017            ANDREW EWING DO            Ot            Y99.8  
          OTHER EXTERNAL CAUSE STATUS                     

 

 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                     

 

 2017            DEEPAK LEE APRARELY            Ot            S67.42XD 
           CRUSHING INJURY OF LEFT WRIST AND HAND,                      

 

 2017            BRAULIO REED MD            Ot            I77.1       
     STRICTURE OF ARTERY                     

 

 2017            BRAULIO REED MD            Ot            L98.499     
       NON-PRESSURE CHRONIC ULCER OF SKIN OF SI                     

 

 2017            BRAULIO REED MD            Ot            M35.8       
     OTHER SPECIFIED SYSTEMIC INVOLVEMENT OF                      

 

 2017            BRAULIO REED MD            Ot            Z72.0       
     TOBACCO USE                     

 

 2017            BRAULIO REED MD            Ot            I73.9       
     PERIPHERAL VASCULAR DISEASE, UNSPECIFIED                     

 

 2017            BRAULIO REED MD            Ot            I77.1       
     STRICTURE OF ARTERY                     

 

 2017            BRAULIO REED MD            Ot            L98.499     
       NON-PRESSURE CHRONIC ULCER OF SKIN OF SI                     

 

 2017            BRAULIO REED MD            Ot            M35.8       
     OTHER SPECIFIED SYSTEMIC INVOLVEMENT OF                      

 

 2017            BRAULIO REED MD            Ot            R06.02      
      SHORTNESS OF BREATH                     

 

 2017            BRAULIO REED MD, Ot            Z01.810     
       ENCOUNTER FOR PREPROCEDURAL CARDIOVASCUL                     

 

 2017            BRAULIO REED MD, Ot            Z72.0       
     TOBACCO USE                     

 

 2017            BRAULIO REED MD            Ot            I65.21      
      OCCLUSION AND STENOSIS OF RIGHT CAROTID                      

 

 2017            BRAULIO REED MD            Ot            J43.9       
     EMPHYSEMA, UNSPECIFIED                     

 

 2017            BRAULIO REED MD, Ot            Z95.828     
       PRESENCE OF OTHER VASCULAR IMPLANTS AND                      

 

 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                     

 

 2017            ANDREW EWING DO            Ot            
S69.92XA            UNSP INJURY OF LEFT WRIST, HAND AND FING                   
  

 

 2017            ANDREW EWING DO            Ot            
X58.XXXA            EXPOSURE TO OTHER SPECIFIED FACTORS, INI                   
  

 

 2017            ANDREW EWING DO            Ot            Y99.8  
          OTHER EXTERNAL CAUSE STATUS                     

 

 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                     

 

 2017            DEEPAK LEE APRARELY            Ot            S67.42XD 
           CRUSHING INJURY OF LEFT WRIST AND HAND,                      

 

 2017            BRAULIO REED MD            Ot            I77.1       
     STRICTURE OF ARTERY                     

 

 2017            BRAULIO REED MD            Ot            L98.499     
       NON-PRESSURE CHRONIC ULCER OF SKIN OF SI                     

 

 2017            BRAULIO REED MD            Ot            M35.8       
     OTHER SPECIFIED SYSTEMIC INVOLVEMENT OF                      

 

 2017            BRAULIO REED MD, Ot            Z72.0       
     TOBACCO USE                     

 

 2017            BRAULIO REED MD            Ot            I73.9       
     PERIPHERAL VASCULAR DISEASE, UNSPECIFIED                     

 

 2017            BRAULIO REED MD            Ot            I77.1       
     STRICTURE OF ARTERY                     

 

 2017            BRAULIO REED MD            Ot            L98.499     
       NON-PRESSURE CHRONIC ULCER OF SKIN OF SI                     

 

 2017            BRAULIO REED MD            Ot            M35.8       
     OTHER SPECIFIED SYSTEMIC INVOLVEMENT OF                      

 

 2017            BRAULIO REED MD            Ot            R06.02      
      SHORTNESS OF BREATH                     

 

 2017            BRAULIO REED MD, Ot            Z01.810     
       ENCOUNTER FOR PREPROCEDURAL CARDIOVASCUL                     

 

 2017            BRAULIO REED MD            Ot            Z72.0       
     TOBACCO USE                     

 

 2017            BRAULIO REED MD            Ot            I65.21      
      OCCLUSION AND STENOSIS OF RIGHT CAROTID                      

 

 2017            BRAULIO REED MD            Ot            J43.9       
     EMPHYSEMA, UNSPECIFIED                     

 

 2017            BRAULIO REED MD            Ot            Z95.828     
       PRESENCE OF OTHER VASCULAR IMPLANTS AND                      

 

 2017            GELLENDER DOANDREW            Ot            R91.8  
          OTHER NONSPECIFIC ABNORMAL FINDING OF PACO                     

 

 2017            BRAULIO REED MD            Ot            I65.21      
      OCCLUSION AND STENOSIS OF RIGHT CAROTID                      

 

 2017            BRAULIO REED MD, Ot            J43.9       
     EMPHYSEMA, UNSPECIFIED                     

 

 2017            BRAULIO REED MD            Ot            Z95.828     
       PRESENCE OF OTHER VASCULAR IMPLANTS AND                      

 

 2017            GELLENDER DOANDREW            Ot            R91.8  
          OTHER NONSPECIFIC ABNORMAL FINDING OF PACO                     

 

 2017            GELLENDER DOANDREW            Ot            R91.8  
          OTHER NONSPECIFIC ABNORMAL FINDING OF PACO                     

 

 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                     

 

 2017            [facility] ANDREW CALDWELL            Ot            
S69.92XA            UNSP INJURY OF LEFT WRIST, HAND AND FING                   
  

 

 2017            [facility] ANDREW CALDWELL            Ot            
X58.XXXA            EXPOSURE TO OTHER SPECIFIED FACTORS, INI                   
  

 

 2017            KAYLIN ANDREW CALDWELL            Ot            Y99.8  
          OTHER EXTERNAL CAUSE STATUS                     

 

 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                     

 

 2017            DEEPAK LEE APRARELY            Ot            S67.42XD 
           CRUSHING INJURY OF LEFT WRIST AND HAND,                      

 

 2017            BRAULIO REED MD            Ot            I77.1       
     STRICTURE OF ARTERY                     

 

 2017            BRAULIO REED MD            Ot            L98.499     
       NON-PRESSURE CHRONIC ULCER OF SKIN OF SI                     

 

 2017            BRAULIO REED MD            Ot            M35.8       
     OTHER SPECIFIED SYSTEMIC INVOLVEMENT OF                      

 

 2017            BRAULIO REED MD            Ot            Z72.0       
     TOBACCO USE                     

 

 2017            BRAULIO REED MD            Ot            I73.9       
     PERIPHERAL VASCULAR DISEASE, UNSPECIFIED                     

 

 2017            BRAULIO REED MD            Ot            I77.1       
     STRICTURE OF ARTERY                     

 

 2017            BRAULIO REED MD            Ot            L98.499     
       NON-PRESSURE CHRONIC ULCER OF SKIN OF SI                     

 

 2017            BRAULIO REED MD            Ot            M35.8       
     OTHER SPECIFIED SYSTEMIC INVOLVEMENT OF                      

 

 2017            BRAULIO REED MD            Ot            R06.02      
      SHORTNESS OF BREATH                     

 

 2017            BRAULIO REED MD, Ot            Z01.810     
       ENCOUNTER FOR PREPROCEDURAL CARDIOVASCUL                     

 

 2017            BRAULIO REED MD            Ot            Z72.0       
     TOBACCO USE                     

 

 2017            BRAULIO REED MD            Ot            I65.21      
      OCCLUSION AND STENOSIS OF RIGHT CAROTID                      

 

 2017            BRAULIO REED MD            Ot            J43.9       
     EMPHYSEMA, UNSPECIFIED                     

 

 2017            BRAULIO REED MD            Ot            Z95.828     
       PRESENCE OF OTHER VASCULAR IMPLANTS AND                      

 

 2017            GELLENDER DOANDREW            Ot            R91.8  
          OTHER NONSPECIFIC ABNORMAL FINDING OF PACO                     

 

 2017            KAYLIN DOANDREW            Ot            Z12.31 
           ENCNTR SCREEN MAMMOGRAM FOR MALIGNANT NE                     

 

 2017            GELLENDER DOANDREW            Ot            Z12.31 
           ENCNTR SCREEN MAMMOGRAM FOR MALIGNANT NE                     

 

 10/10/2017            MARCELINADER ANDREW CALDWELL            Ot            R92.8  
          OTH ABN AND INCONCLUSIVE FINDINGS ON DX                      

 

 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                     

 

 2018            ANDREW EWING DO            Ot            
S69.92XA            UNSP INJURY OF LEFT WRIST, HAND AND FING                   
  

 

 2018            ANDREW EWING DO            Ot            
X58.XXXA            EXPOSURE TO OTHER SPECIFIED FACTORS, INI                   
  

 

 2018            MARCELINADER ANDREW CALDWELL            Ot            Y99.8  
          OTHER EXTERNAL CAUSE STATUS                     

 

 2018            DEEPAK LEE APRARELY            Ot            I73.00   
         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                     

 

 2018            DEEPAK LEE APRARELY            Ot            S67.42XD 
           CRUSHING INJURY OF LEFT WRIST AND HAND,                      

 

 2018            BRAULIO REED MD            Ot            I77.1       
     STRICTURE OF ARTERY                     

 

 2018            BRAULIO REED MD            Ot            L98.499     
       NON-PRESSURE CHRONIC ULCER OF SKIN OF SI                     

 

 2018            BRAULIO REED MD            Ot            M35.8       
     OTHER SPECIFIED SYSTEMIC INVOLVEMENT OF                      

 

 2018            BRAULIO REED MD            Ot            Z72.0       
     TOBACCO USE                     

 

 2018            BRAULIO REED MD            Ot            I73.9       
     PERIPHERAL VASCULAR DISEASE, UNSPECIFIED                     

 

 2018            BRAULIO REED MD            Ot            I77.1       
     STRICTURE OF ARTERY                     

 

 2018            BRAULIO REED MD            Ot            L98.499     
       NON-PRESSURE CHRONIC ULCER OF SKIN OF SI                     

 

 2018            BRAULIO REED MD            Ot            M35.8       
     OTHER SPECIFIED SYSTEMIC INVOLVEMENT OF                      

 

 2018            BRAULIO REED MD            Ot            R06.02      
      SHORTNESS OF BREATH                     

 

 2018            BRAULIO REED MD            Ot            Z01.810     
       ENCOUNTER FOR PREPROCEDURAL CARDIOVASCUL                     

 

 2018            BRAULIO REED MD            Ot            Z72.0       
     TOBACCO USE                     

 

 2018            BRAULIO REED MD            Ot            I65.21      
      OCCLUSION AND STENOSIS OF RIGHT CAROTID                      

 

 2018            BRAULIO REED MD            Ot            J43.9       
     EMPHYSEMA, UNSPECIFIED                     

 

 2018            BRAULIO REED MD            Ot            Z95.828     
       PRESENCE OF OTHER VASCULAR IMPLANTS AND                      

 

 2018            GELJOSETTEDER ANDREW CALDWELL            Ot            R91.8  
          OTHER NONSPECIFIC ABNORMAL FINDING OF PACO                     

 

 2018            ANDREW EWING DO            Ot            Z12.31 
           ENCNTR SCREEN MAMMOGRAM FOR MALIGNANT NE                     

 

 2018            MARCELINADENIA ANDREW CALDWELL            Ot            R92.8  
          OTH ABN AND INCONCLUSIVE FINDINGS ON DX                      

 

 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                     

 

 2018            ANDREW EWING DO            Ot            
S69.92XA            UNSP INJURY OF LEFT WRIST, HAND AND FING                   
  

 

 2018            SONAMJOSETTEDENIA ANDREW CALDWELL            Ot            
X58.XXXA            EXPOSURE TO OTHER SPECIFIED FACTORS, INI                   
  

 

 2018            ANDREW EWING DO            Ot            Y99.8  
          OTHER EXTERNAL CAUSE STATUS                     

 

 2018            DEEPAK LEE APRARELY            Ot            I73.00   
         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                     

 

 2018            DEEPAK LEE APRARELY            Ot            S67.42XD 
           CRUSHING INJURY OF LEFT WRIST AND HAND,                      

 

 2018            BRAULIO REED MD            Ot            I77.1       
     STRICTURE OF ARTERY                     

 

 2018            BRAULIO REED MD            Ot            L98.499     
       NON-PRESSURE CHRONIC ULCER OF SKIN OF SI                     

 

 2018            BRAULIO REED MD            Ot            M35.8       
     OTHER SPECIFIED SYSTEMIC INVOLVEMENT OF                      

 

 2018            BRAULIO REED MD            Ot            Z72.0       
     TOBACCO USE                     

 

 2018            BRAULIO REED MD            Ot            I73.9       
     PERIPHERAL VASCULAR DISEASE, UNSPECIFIED                     

 

 2018            BRAULIO REED MD            Ot            I77.1       
     STRICTURE OF ARTERY                     

 

 2018            BRAULIO REED MD            Ot            L98.499     
       NON-PRESSURE CHRONIC ULCER OF SKIN OF SI                     

 

 2018            BRAULIO REED MD            Ot            M35.8       
     OTHER SPECIFIED SYSTEMIC INVOLVEMENT OF                      

 

 2018            BRAULIO REED MD            Ot            R06.02      
      SHORTNESS OF BREATH                     

 

 2018            BRAULIO REED MD            Ot            Z01.810     
       ENCOUNTER FOR PREPROCEDURAL CARDIOVASCUL                     

 

 2018            BRAULIO REED MD            Ot            Z72.0       
     TOBACCO USE                     

 

 2018            BRAULIO REED MD            Ot            I65.21      
      OCCLUSION AND STENOSIS OF RIGHT CAROTID                      

 

 2018            BRAULIO REED MD            Ot            J43.9       
     EMPHYSEMA, UNSPECIFIED                     

 

 2018            BRAULIO REED MD            Ot            Z95.828     
       PRESENCE OF OTHER VASCULAR IMPLANTS AND                      

 

 2018            GELLENDER DO, ANDREW GRIMM            Ot            R91.8  
          OTHER NONSPECIFIC ABNORMAL FINDING OF PAOC                     

 

 2018            GELLENDER DOANDREW            Ot            Z12.31 
           ENCNTR SCREEN MAMMOGRAM FOR MALIGNANT NE                     

 

 2018            GELLENDER DO, ANDREW GRIMM            Ot            R92.8  
          OT ABN AND INCONCLUSIVE FINDINGS ON DX                      

 

 2018            GELLENDER DO, ANDREW GRIMM            Ot            Z12.31 
           ENCNTR SCREEN MAMMOGRAM FOR MALIGNANT NE                     

 

 2018            GELLENDER DO, ANDREW GRIMM            Ot            Z12.31 
           ENCNTR SCREEN MAMMOGRAM FOR MALIGNANT NE                     

 

 2018            GELLENDER DO, ANDREW GRIMM            Ot            R92.2  
          INCONCLUSIVE MAMMOGRAM                     

 

 2019            GELLENDER DO, ANDREW GRIMM            Ot            R92.2  
          INCONCLUSIVE MAMMOGRAM                     



                                                                               
                                                                               
                                                                               
                                                                               
                                                                               
                                                                               
                                                                               
                                                                               
                                        



Procedures

      



There is no data.                  



Results

      





 Test            Result            Range        









 Complete urinalysis with reflex to culture - 16 11:56         









 Urine color determination            YELLOW             NRG        

 

 Urine clarity determination            CLEAR             NRG        

 

 Urine pH measurement by test strip            7             5-9        

 

 Specific gravity of urine by test strip            1.010             1.016-
1.022        

 

 Urine protein assay by test strip, semi-quantitative            NEGATIVE      
       NEGATIVE        

 

 Urine glucose detection by automated test strip            NEGATIVE           
  NEGATIVE        

 

 Erythrocytes detection in urine sediment by light microscopy            4+    
         NEGATIVE        

 

 Urine ketones detection by automated test strip            NEGATIVE           
  NEGATIVE        

 

 Urine nitrite detection by test strip            NEGATIVE             NEGATIVE
        

 

 Urine total bilirubin detection by test strip            NEGATIVE             
NEGATIVE        

 

 Urine urobilinogen measurement by automated test strip (mass/volume)          
  NORMAL             NORMAL        

 

 Urine leukocyte esterase detection by dipstick            NEGATIVE             
NEGATIVE        

 

 Automated urine sediment erythrocyte count by microscopy (number/high power 
field)             [HPF]            NRG        

 

 Automated urine sediment leukocyte count by microscopy (number/high power field
)            NONE             NRG        

 

 Bacteria detection in urine sediment by light microscopy            TRACE     
        NRG        

 

 Squamous epithelial cells detection in urine sediment by light microscopy     
       2-5             NRG        

 

 Crystals detection in urine sediment by light microscopy            NONE      
       NRG        

 

 Casts detection in urine sediment by light microscopy            NONE         
    NRG        

 

 Mucus detection in urine sediment by light microscopy            NEGATIVE     
        NRG        

 

 Complete urinalysis with reflex to culture            NO             NRG      
  









 Automated blood complete blood count (hemogram) panel - 16 12:00         









 Blood leukocytes automated count (number/volume)            11.5 10*3/uL      
      4.3-11.0        

 

 Blood erythrocytes automated count (number/volume)            4.65 10*6/uL    
        4.35-5.85        

 

 Venous blood hemoglobin measurement (mass/volume)            14.7 g/dL        
    11.5-16.0        

 

 Blood hematocrit (volume fraction)            45 %            35-52        

 

 Automated erythrocyte mean corpuscular volume            96 [foz_us]          
  80-99        

 

 Automated erythrocyte mean corpuscular hemoglobin (mass per erythrocyte)      
      32 pg            25-34        

 

 Automated erythrocyte mean corpuscular hemoglobin concentration measurement (
mass/volume)            33 g/dL            32-36        

 

 Automated erythrocyte distribution width ratio            13.5 %            
10.0-14.5        

 

 Automated blood platelet count (count/volume)            397 10*3/uL          
  130-400        

 

 Automated blood platelet mean volume measurement            10.5 [foz_us]     
       7.4-10.4        









 PT panel in platelet poor plasma by coagulation assay - 16 12:00         









 Prothrombin time (PT) in platelet poor plasma by coagulation assay            
12.5 s            12.2-14.7        

 

 INR in platelet poor plasma or blood by coagulation assay            1.0      
       0.8-1.4        









 Activated partial thromboplastin time (aPTT) in platelet poor plasma 
bycoagulation assay - 16 12:00         









 Activated partial thromboplastin time (aPTT) in platelet poor plasma 
bycoagulation assay            25 s            24-35        









 Comprehensive metabolic panel - 16 12:00         









 Serum or plasma sodium measurement (moles/volume)            138 mmol/L       
     135-145        

 

 Serum or plasma potassium measurement (moles/volume)            3.9 mmol/L    
        3.6-5.0        

 

 Serum or plasma chloride measurement (moles/volume)            108 mmol/L     
               

 

 Carbon dioxide            23 mmol/L            21-32        

 

 Serum or plasma anion gap determination (moles/volume)            7 mmol/L    
        5-14        

 

 Serum or plasma urea nitrogen measurement (mass/volume)            10 mg/dL   
         7-18        

 

 Serum or plasma creatinine measurement (mass/volume)            0.64 mg/dL    
        0.60-1.30        

 

 Serum or plasma urea nitrogen/creatinine mass ratio            16             
NRG        

 

 Serum or plasma creatinine measurement with calculation of estimated 
glomerular filtration rate            >             NRG        

 

 Serum or plasma glucose measurement (mass/volume)            92 mg/dL         
           

 

 Serum or plasma calcium measurement (mass/volume)            9.2 mg/dL        
    8.5-10.1        

 

 Serum or plasma total bilirubin measurement (mass/volume)            0.5 mg/dL
            0.1-1.0        

 

 Serum or plasma alkaline phosphatase measurement (enzymatic activity/volume)  
          72 U/L                    

 

 Serum or plasma aspartate aminotransferase measurement (enzymatic activity/
volume)            16 U/L            5-34        

 

 Serum or plasma alanine aminotransferase measurement (enzymatic activity/volume
)            15 U/L            0-55        

 

 Serum or plasma protein measurement (mass/volume)            6.8 g/dL         
   6.4-8.2        

 

 Serum or plasma albumin measurement (mass/volume)            4.1 g/dL         
   3.2-4.5        









 Methicillin resistant Staphylococcus aureus (MRSA) screening culture -  12:00         









 Methicillin resistant Staphylococcus aureus (MRSA) screening culture          
  NEG             NRG        









 Activated partial thromboplastin time (aPTT) in platelet poor plasma 
bycoagulation assay - 16 17:47         









 Activated partial thromboplastin time (aPTT) in platelet poor plasma 
bycoagulation assay            47 s            24-35        









 Complete blood count (CBC) with automated white blood cell (WBC) differential 
- 16 07:39         









 Blood leukocytes automated count (number/volume)            12.8 10*3/uL      
      4.3-11.0        

 

 Blood erythrocytes automated count (number/volume)            4.23 10*6/uL    
        4.35-5.85        

 

 Venous blood hemoglobin measurement (mass/volume)            13.7 g/dL        
    11.5-16.0        

 

 Blood hematocrit (volume fraction)            40 %            35-52        

 

 Automated erythrocyte mean corpuscular volume            96 [foz_us]          
  80-99        

 

 Automated erythrocyte mean corpuscular hemoglobin (mass per erythrocyte)      
      32 pg            25-34        

 

 Automated erythrocyte mean corpuscular hemoglobin concentration measurement (
mass/volume)            34 g/dL            32-36        

 

 Automated erythrocyte distribution width ratio            13.5 %            
10.0-14.5        

 

 Automated blood platelet count (count/volume)            350 10*3/uL          
  130-400        

 

 Automated blood platelet mean volume measurement            10.6 [foz_us]     
       7.4-10.4        

 

 Automated blood neutrophils/100 leukocytes            80 %            42-75   
     

 

 Automated blood lymphocytes/100 leukocytes            15 %            12-44   
     

 

 Blood monocytes/100 leukocytes            3 %            0-12        

 

 Automated blood eosinophils/100 leukocytes            1 %            0-10     
   

 

 Automated blood basophils/100 leukocytes            1 %            0-10        

 

 Blood neutrophils automated count (number/volume)            10.2 10*3        
    1.8-7.8        

 

 Blood lymphocytes automated count (number/volume)            1.9 10*3         
   1.0-4.0        

 

 Blood monocytes automated count (number/volume)            0.4 10*3            
0.0-1.0        

 

 Automated eosinophil count            0.2 10*3/uL            0.0-0.3        

 

 Automated blood basophil count (count/volume)            0.1 10*3/uL          
  0.0-0.1        









 Comprehensive metabolic panel - 16 07:39         









 Serum or plasma sodium measurement (moles/volume)            136 mmol/L       
     135-145        

 

 Serum or plasma potassium measurement (moles/volume)            3.8 mmol/L    
        3.6-5.0        

 

 Serum or plasma chloride measurement (moles/volume)            106 mmol/L     
               

 

 Carbon dioxide            19 mmol/L            21-32        

 

 Serum or plasma anion gap determination (moles/volume)            11 mmol/L   
         5-14        

 

 Serum or plasma urea nitrogen measurement (mass/volume)            8 mg/dL    
        7-18        

 

 Serum or plasma creatinine measurement (mass/volume)            0.70 mg/dL    
        0.60-1.30        

 

 Serum or plasma urea nitrogen/creatinine mass ratio            11             
NRG        

 

 Serum or plasma creatinine measurement with calculation of estimated 
glomerular filtration rate            >             NRG        

 

 Serum or plasma glucose measurement (mass/volume)            165 mg/dL        
            

 

 Serum or plasma calcium measurement (mass/volume)            8.6 mg/dL        
    8.5-10.1        

 

 Serum or plasma total bilirubin measurement (mass/volume)            0.6 mg/dL
            0.1-1.0        

 

 Serum or plasma alkaline phosphatase measurement (enzymatic activity/volume)  
          56 U/L                    

 

 Serum or plasma aspartate aminotransferase measurement (enzymatic activity/
volume)            18 U/L            5-34        

 

 Serum or plasma alanine aminotransferase measurement (enzymatic activity/volume
)            17 U/L            0-55        

 

 Serum or plasma protein measurement (mass/volume)            6.2 g/dL         
   6.4-8.2        

 

 Serum or plasma albumin measurement (mass/volume)            3.6 g/dL         
   3.2-4.5        









 Automated blood complete blood count (hemogram) panel - 17 09:14         









 Blood leukocytes automated count (number/volume)            11.0 10*3/uL      
      4.3-11.0        

 

 Blood erythrocytes automated count (number/volume)            4.95 10*6/uL    
        4.35-5.85        

 

 Venous blood hemoglobin measurement (mass/volume)            15.0 g/dL        
    11.5-16.0        

 

 Blood hematocrit (volume fraction)            46 %            35-52        

 

 Automated erythrocyte mean corpuscular volume            93 [foz_us]          
  80-99        

 

 Automated erythrocyte mean corpuscular hemoglobin (mass per erythrocyte)      
      30 pg            25-34        

 

 Automated erythrocyte mean corpuscular hemoglobin concentration measurement (
mass/volume)            33 g/dL            32-36        

 

 Automated erythrocyte distribution width ratio            13.7 %            
10.0-14.5        

 

 Automated blood platelet count (count/volume)            336 10*3/uL          
  130-400        

 

 Automated blood platelet mean volume measurement            11.0 [foz_us]     
       7.4-10.4        









 PT panel in platelet poor plasma by coagulation assay - 17 09:14         









 Prothrombin time (PT) in platelet poor plasma by coagulation assay            
12.6 s            12.2-14.7        

 

 INR in platelet poor plasma or blood by coagulation assay            1.0      
       0.8-1.4        









 Activated partial thromboplastin time (aPTT) in platelet poor plasma 
bycoagulation assay - 17 09:14         









 Activated partial thromboplastin time (aPTT) in platelet poor plasma 
bycoagulation assay            25 s            24-35        









 Comprehensive metabolic panel - 17 09:14         









 Serum or plasma sodium measurement (moles/volume)            139 mmol/L       
     135-145        

 

 Serum or plasma potassium measurement (moles/volume)            3.6 mmol/L    
        3.6-5.0        

 

 Serum or plasma chloride measurement (moles/volume)            106 mmol/L     
               

 

 Carbon dioxide            24 mmol/L            21-32        

 

 Serum or plasma anion gap determination (moles/volume)            9 mmol/L    
        5-14        

 

 Serum or plasma urea nitrogen measurement (mass/volume)            10 mg/dL   
         7-18        

 

 Serum or plasma creatinine measurement (mass/volume)            0.75 mg/dL    
        0.60-1.30        

 

 Serum or plasma urea nitrogen/creatinine mass ratio            13             
NRG        

 

 Serum or plasma creatinine measurement with calculation of estimated 
glomerular filtration rate            >             NRG        

 

 Serum or plasma glucose measurement (mass/volume)            89 mg/dL         
           

 

 Serum or plasma calcium measurement (mass/volume)            9.0 mg/dL        
    8.5-10.1        

 

 Serum or plasma total bilirubin measurement (mass/volume)            0.5 mg/dL
            0.1-1.0        

 

 Serum or plasma alkaline phosphatase measurement (enzymatic activity/volume)  
          66 U/L                    

 

 Serum or plasma aspartate aminotransferase measurement (enzymatic activity/
volume)            14 U/L            5-34        

 

 Serum or plasma alanine aminotransferase measurement (enzymatic activity/volume
)            15 U/L            0-55        

 

 Serum or plasma protein measurement (mass/volume)            6.9 g/dL         
   6.4-8.2        

 

 Serum or plasma albumin measurement (mass/volume)            4.3 g/dL         
   3.2-4.5        









 Lipid 1996 panel - 17 09:14         









 Serum or plasma triglyceride measurement (mass/volume)            146 mg/dL   
         <150        

 

 Serum or plasma cholesterol measurement (mass/volume)            209 mg/dL    
        < 200        

 

 Serum or plasma cholesterol in HDL measurement (mass/volume)            40 mg/
dL            40-60        

 

 Cholesterol in LDL [mass/volume] in serum or plasma by direct assay            
162 mg/dL            1-129        

 

 Serum or plasma cholesterol in VLDL measurement (mass/volume)            29 mg/
dL            5-40        









 Complete urinalysis with reflex to culture - 17 09:14         









 Urine color determination            YELLOW             NRG        

 

 Urine clarity determination            CLEAR             NRG        

 

 Urine pH measurement by test strip            6.5             5-9        

 

 Specific gravity of urine by test strip            1.010             1.016-
1.022        

 

 Urine protein assay by test strip, semi-quantitative            NEGATIVE      
       NEGATIVE        

 

 Urine glucose detection by automated test strip            NEGATIVE           
  NEGATIVE        

 

 Erythrocytes detection in urine sediment by light microscopy            2+    
         NEGATIVE        

 

 Urine ketones detection by automated test strip            NEGATIVE           
  NEGATIVE        

 

 Urine nitrite detection by test strip            NEGATIVE             NEGATIVE
        

 

 Urine total bilirubin detection by test strip            NEGATIVE             
NEGATIVE        

 

 Urine urobilinogen measurement by automated test strip (mass/volume)          
  NORMAL             NORMAL        

 

 Urine leukocyte esterase detection by dipstick            NEGATIVE             
NEGATIVE        

 

 Automated urine sediment erythrocyte count by microscopy (number/high power 
field)             [HPF]            NRG        

 

 Automated urine sediment leukocyte count by microscopy (number/high power field
)            NONE             NRG        

 

 Bacteria detection in urine sediment by light microscopy            NEGATIVE  
           NRG        

 

 Squamous epithelial cells detection in urine sediment by light microscopy     
       2-5             NRG        

 

 Crystals detection in urine sediment by light microscopy            NONE      
       NRG        

 

 Casts detection in urine sediment by light microscopy            NONE         
    NRG        

 

 Mucus detection in urine sediment by light microscopy            NEGATIVE     
        NRG        

 

 Complete urinalysis with reflex to culture            NO             NRG      
  









 Methicillin resistant Staphylococcus aureus (MRSA) screening culture -  09:14         









 Methicillin resistant Staphylococcus aureus (MRSA) screening culture          
  NEG             NRG        









 Automated blood complete blood count (hemogram) panel - 17 04:38         









 Blood leukocytes automated count (number/volume)            13.2 10*3/uL      
      4.3-11.0        

 

 Blood erythrocytes automated count (number/volume)            4.76 10*6/uL    
        4.35-5.85        

 

 Venous blood hemoglobin measurement (mass/volume)            14.2 g/dL        
    11.5-16.0        

 

 Blood hematocrit (volume fraction)            44 %            35-52        

 

 Automated erythrocyte mean corpuscular volume            93 [foz_us]          
  80-99        

 

 Automated erythrocyte mean corpuscular hemoglobin (mass per erythrocyte)      
      30 pg            25-34        

 

 Automated erythrocyte mean corpuscular hemoglobin concentration measurement (
mass/volume)            32 g/dL            32-36        

 

 Automated erythrocyte distribution width ratio            13.8 %            
10.0-14.5        

 

 Automated blood platelet count (count/volume)            337 10*3/uL          
  130-400        

 

 Automated blood platelet mean volume measurement            11.0 [foz_us]     
       7.4-10.4        









 Whole blood basic metabolic panel - 17 04:38         









 Serum or plasma sodium measurement (moles/volume)            137 mmol/L       
     135-145        

 

 Serum or plasma potassium measurement (moles/volume)            3.5 mmol/L    
        3.6-5.0        

 

 Serum or plasma chloride measurement (moles/volume)            105 mmol/L     
               

 

 Carbon dioxide            23 mmol/L            21-32        

 

 Serum or plasma anion gap determination (moles/volume)            9 mmol/L    
        5-14        

 

 Serum or plasma urea nitrogen measurement (mass/volume)            6 mg/dL    
        7-18        

 

 Serum or plasma creatinine measurement (mass/volume)            0.74 mg/dL    
        0.60-1.30        

 

 Serum or plasma urea nitrogen/creatinine mass ratio            8             
NRG        

 

 Serum or plasma creatinine measurement with calculation of estimated 
glomerular filtration rate            >             NRG        

 

 Serum or plasma glucose measurement (mass/volume)            87 mg/dL         
           

 

 Serum or plasma calcium measurement (mass/volume)            8.9 mg/dL        
    8.5-10.1        



                                                  



Encounters

      





 ACCT No.            Visit Date/Time            Discharge            Status    
        Pt. Type            Provider            Facility            Loc./Unit  
          Complaint        

 

 [id number]            2018 08:11:00            2018 23:59:59    
        CLS            Outpatient            ANDREW EWING DO            
[facility]            RAD            ABN MAMMO        

 

 [id number]            2018 07:09:00            2018 23:59:59    
        CLS            Outpatient            ANDREW EWING DO            
[facility]            RAD            SCREENING        

 

 [id number]            2017 07:41:00            2017 23:59:59    
        CLS            Outpatient            ANDREW EWING DO            
[facility]            RAD            ABN MAMMO        

 

 [id number]            2017 07:23:00            2017 23:59:59    
        CLS            Outpatient            ANDREW EWING DO            
[facility]            RAD            YEARLY MAMMOGRAM    
    

 

 [id number]            2017 15:58:00            2017 23:59:59    
        CLS            Outpatient            ANDREW EWING DO            
[facility]            RAD            COPD        

 

 [id number]            2017 07:50:00            2017 23:59:59    
        CLS            Outpatient            BRAULIO REED MD            [facility]            RAD            CAROTID STENOSIS        

 

 [id number]            2017 07:45:00            2017 09:05:00    
        DIS            Outpatient            BRAULIO REED MD            [facility]            CATH            PAD,HTN,TOBACCOISM,SOB 
       

 

 [id number]            2017 07:26:00            2017 23:59:59    
        CLS            Outpatient            BRAULIO REED MD            [facility]            CARD            SOB,CAD,PAD        

 

 [id number]            2017 07:36:00            2017 23:59:59    
        CLS            Outpatient            BRAULIO REED MD            [facility]            CARD            SOB, CAD, PAD        

 

 [id number]            2016 15:12:00            2016 16:00:00    
        DIS            Outpatient            DEEPAK ELE APRARELY            
[facility]            WOUNDCARE                     

 

 [id number]            2016 15:08:00            10/03/2016 09:17:00    
        DIS            Outpatient            DEEPAK LEE APRN            
[facility]            [facility]CARE                     

 

 [id number]            2016 11:30:00            2016 09:30:00    
        DIS            Outpatient            BRAULIO REED MD            [facility]            CATH            SUBCLAVIAN STENOSIS,HTN,
TOBACCOISM        

 

 [id number]            2016 16:47:00            2016 23:59:59    
        CLS            Outpatient            DEEPAK LEE            
[facility]            RAD            INJURY OF L WRIST 
AND HAND,RAYNAUD'S SYNDROME        

 

 [id number]            2016 16:32:00            2016 23:59:59    
        CLS            Outpatient            ANDREW EWING DO            
[facility]            RAD            TRAUMA TO TIP TO 
LEFT DISTAL FINGER        

 

 [id number]            2016 17:07:00            2016 23:59:59    
        CLS            Outpatient            ANDREW EWING DO            
[facility]            LAB            RAYNAUDS PHENOMENON 
       

 

 [id number]            2015 16:04:00            2015 23:59:59    
        CLS            Outpatient            ANDREW EWING DO            
[facility]            RAD            HURT RT ANKLE 8 DAYS 
OR MORE        

 

 [id number]            2015 14:19:00            2015 23:59:59    
        CLS            Outpatient            ANDREW EWING DO            
[facility]            RAD            SCREENING        

 

 [id number]            2014 06:51:00            2014 23:59:59    
        CLS            Outpatient            ANDREW EWING DO            
[facility]            RAD            ROUTINE        

 

 [id number]            2013 14:40:00            2013 23:59:59    
        CLS            Outpatient            ANDREW EWING DO            
[facility]            RAD            ABNORMAL ULTRASOUND,
LONG PERIODS, CRAMPING        

 

 [id number]            2013 09:08:00            2013 23:59:59    
        CLS            Outpatient            ANDREW EWING DO            
[facility]            RAD            HEAVY   LONG PERIODS,
WEIGHT LOSS        

 

 [id number]            2013 13:51:00            2013 23:59:59    
        CLS            Outpatient            ANDREW EWING DO            
[facility]            RAD            HEAVY   LONG PERIODS,
WEIGHT LOSS        

 

 [id number]            2019 11:00:00                         PEN       
     Preadmit            CUNNINGHAMDANIELA WARE DO            [facility]            ENDO            + COLOGUARD        

 

 [id number]            2016 21:03:00                                   
   Document Registration                                                       
     

 

 [id number]            2013 13:38:00                                   
   Document Registration                                                       
     

 

 [id number]            2013 09:45:00                                   
   Document Registration                                                       
     

 

 [id number]            2011 11:33:00                                   
   Document Registration                                                       
     

 

 [id number]            2011 07:44:00                                   
   Document Registration                                                       
     

 

 [id number]            11/10/2011 11:31:00                                   
   Document Registration

## 2019-02-16 NOTE — ED CHEST PAIN
General


Chief Complaint:  Chest Wall/Rib Pain


Stated Complaint:  SHOULDER/NECK/MOUTH PAIN


Nursing Triage Note:  


AMB TO ROOM REPORTS 8AM TODAY ONSET PAIN IN L SHOULDR SCAPULA WITH RADIATIN TO 


NECK  AND TEETH.


Nursing Sepsis Screen:  No Definite Risk


Source:  patient


Exam Limitations:  no limitations





History of Present Illness


Date Seen by Provider:  2019


Time Seen by Provider:  08:32


Initial Comments


This 48-year-old woman presents to the emergency room with pain in the left 

shoulder that radiates up to her neck and teeth that started around 08:00.  

Patient has history of a subclavian and lower extremity stent.  She also has 

some dental pain.  Pain seems constant and is a little worse with turning her 

head to the left.  Pain on arrival was 9 out of 10.  Dr. Ordaz is her primary 

cardiologist and Dr. Ewing a primary care provider.





Allergies and Home Medications


Allergies


Coded Allergies:  


     Erythromycin Lactobionate (Unverified  Allergy, Severe, SOA, RASH, 11/10/11

)


     acetaminophen (Unverified  Allergy, Severe, SOA, RASH, 11/10/11)


     methocarbamol (Unverified  Allergy, Severe, SOA, RASH, 11/10/11)


     oxycodone HCl (Unverified  Allergy, Severe, SOA, RASH, 11/10/11)


     Sulfa (Sulfonamide Antibiotics) (Verified  Allergy, Unknown, 07)


     codeine (Verified  Allergy, Unknown, 07)





Home Medications


Acetaminophen 500 Mg Tablet, 1,000 MG PO Q6H PRN for HEADACHE, (Reported)


Aspirin 81 Mg Tablet.dr, 81 MG PO DAILY, (Reported)


Atorvastatin Calcium 20 Mg Tablet, 20 MG PO HS


   Prescribed by: BRAULIO ORDAZ on 17 0800


Budesonide/Formoterol Fumarate 10.2 Gm Hfa.aer.ad, 1 PUFF IH DAILY, (Reported)


Clopidogrel Bisulfate 75 Mg Tablet, 75 MG PO DAILY, (Reported)


Meclizine HCl 25 Mg Tablet, 25 MG PO TID, (Reported)


Omega 3 Polyunsat Fatty Acids 1,000 Mg Cap, 1,000 MG PO BID WITH MEALS


   Prescribed by: BRAULIO ORDAZ on 17 0800


Pantoprazole Sodium 40 Mg Tablet.dr, 40 MG PO DAILY, (Reported)


Tiotropium Bromide 4 Gm Mist.inhal, 2 PUFF IH DAILY, (Reported)





Patient Home Medication List


Home Medication List Reviewed:  Yes





Review of Systems


Review of Systems


Constitutional:  no symptoms reported


EENTM:  See HPI


Respiratory:  No Symptoms Reported


Cardiovascular:  See HPI


Gastrointestinal:  No Symptoms Reported


Genitourinary:  No Symptoms Reported


Musculoskeletal:  see HPI


Skin:  no symptoms reported


Psychiatric/Neurological:  No Symptoms Reported


Endocrine:  No Symptoms Reported


Hematologic/Lymphatic:  No Symptoms Reported





Past Medical-Social-Family Hx


Past Med/Social Hx:  Reviewed and Corrections made


Patient Social History


Alcohol Use:  Denies Use


Recreational Drug Use:  No


Smoking Status:  Former Smoker


Type Used:  Cigarettes


Recent Foreign Travel:  No


Contact w/Someone Who Travel:  No


Recent Infectious Disease Expo:  No





Immunizations Up To Date


Date of Influenza Vaccine:  Oct 12, 2016





Past Medical History


Surgeries:  Yes


Cardiac (Angiography ), Tonsillectomy, Tubal Ligation, Vascular Surgery (

Subclavian and lower extremity arterial stenting)


Respiratory:  Yes


COPD


Cardiac:  Yes


Coronary Artery Disease, Peripheral Vascular (In the extremities and carotid 

arteries)


Neurological:  No (vertigo)


Pregnant:  No


Reproductive Disorders:  No


GYN History:  Tubal Ligation


Genitourinary:  No


Gastrointestinal:  No


Musculoskeletal:  Yes


Arthritis


Endocrine:  No


Cancer:  No


Psychosocial:  No


Integumentary:  No


Blood Disorders:  No





Physical Exam


Vital Signs


Capillary Refill : Less Than 3 Seconds


Height, Weight, BMI


Height: 5'3.00"


Weight: 180lbs. 1.0oz. 81.799227ef; 28.7 BMI


Method:Stated


General Appearance:  No Apparent Distress, WD/WN


HEENT:  PERRL/EOMI, Normal ENT Inspection


Neck:  Normal Inspection, Other (Tenderness over the left sternocleidomastoid 

muscle)


Respiratory:  Lungs Clear, Normal Breath Sounds, No Accessory Muscle Use, No 

Respiratory Distress


Cardiovascular:  Regular Rate, Rhythm, No Edema, No Murmur


Gastrointestinal:  Normal Bowel Sounds, Non Tender, Soft


Extremity:  Normal Inspection, No Pedal Edema


Neurologic/Psychiatric:  Alert, Oriented x3, No Motor/Sensory Deficits, Normal 

Mood/Affect, CNs II-XII Norm as Tested


Skin:  Normal Color, Warm/Dry





Progress/Results/Core Measures


Results/Orders


Lab Results





Laboratory Tests








Test


 19


08:50 19


14:07 Range/Units


 


 


White Blood Count


 10.9 


 


 4.3-11.0


10^3/uL


 


Red Blood Count


 4.26 L


 


 4.35-5.85


10^6/uL


 


Hemoglobin 12.9   11.5-16.0  G/DL


 


Hematocrit 41   35-52  %


 


Mean Corpuscular Volume 96   80-99  FL


 


Mean Corpuscular Hemoglobin 30   25-34  PG


 


Mean Corpuscular Hemoglobin


Concent 32 


 


 32-36  G/DL





 


Red Cell Distribution Width 13.5   10.0-14.5  %


 


Platelet Count


 347 


 


 130-400


10^3/uL


 


Mean Platelet Volume 11.0 H  7.4-10.4  FL


 


Neutrophils (%) (Auto) 69   42-75  %


 


Lymphocytes (%) (Auto) 24   12-44  %


 


Monocytes (%) (Auto) 5   0-12  %


 


Eosinophils (%) (Auto) 2   0-10  %


 


Basophils (%) (Auto) 1   0-10  %


 


Neutrophils # (Auto) 7.5   1.8-7.8  X 10^3


 


Lymphocytes # (Auto) 2.6   1.0-4.0  X 10^3


 


Monocytes # (Auto) 0.6   0.0-1.0  X 10^3


 


Eosinophils # (Auto)


 0.2 


 


 0.0-0.3


10^3/uL


 


Basophils # (Auto)


 0.1 


 


 0.0-0.1


10^3/uL


 


Prothrombin Time 12.6   12.2-14.7  SEC


 


INR Comment 0.9   0.8-1.4  


 


Activated Partial


Thromboplast Time 25 


 


 24-35  SEC





 


D-Dimer


 <= 0.27 


 


 0.00-0.49


UG/ML


 


Sodium Level 138   135-145  MMOL/L


 


Potassium Level 3.7   3.6-5.0  MMOL/L


 


Chloride Level 106     MMOL/L


 


Carbon Dioxide Level 21   21-32  MMOL/L


 


Anion Gap 11   5-14  MMOL/L


 


Blood Urea Nitrogen 15   7-18  MG/DL


 


Creatinine


 0.82 


 


 0.60-1.30


MG/DL


 


Estimat Glomerular Filtration


Rate > 60 


 


  





 


BUN/Creatinine Ratio 18    


 


Glucose Level 102     MG/DL


 


Calcium Level 9.2   8.5-10.1  MG/DL


 


Corrected Calcium 8.9   8.5-10.1  MG/DL


 


Magnesium Level 2.1   1.8-2.4  MG/DL


 


Total Bilirubin 0.4   0.1-1.0  MG/DL


 


Aspartate Amino Transf


(AST/SGOT) 21 


 


 5-34  U/L





 


Alanine Aminotransferase


(ALT/SGPT) 16 


 


 0-55  U/L





 


Alkaline Phosphatase 54     U/L


 


Myoglobin


 30.2 


 


 10.0-92.0


NG/ML


 


Troponin I < 0.028  < 0.028  <0.028  NG/ML


 


Total Protein 7.3   6.4-8.2  GM/DL


 


Albumin 4.4   3.2-4.5  GM/DL








My Orders





Orders - TEJAL MUNGUIA MD


Ekg Tracing (19 08:33)


Cbc With Automated Diff (19 08:56)


Magnesium (19 08:56)


Chest 1 View, Ap/Pa Only (19 08:56)


Cardiac Profile 1 (19 08:56)


Comprehensive Metabolic Panel (19 08:56)


Myoglobin Serum (19 08:56)


Protime With Inr (19 08:56)


Partial Thromboplastin Time (19 08:56)


O2 (19 08:56)


Monitor-Rhythm Ecg Trace Only (19 08:56)


Aspirin Chewable Tablet (Baby Aspirin Ch (19 09:00)


Saline Lock/Iv-Start (19 08:56)


Ketorolac Injection (Toradol Injection) (19 09:00)


Fibrin Degradation Products (19 08:50)


Troponin I (19 14:00)


Heart Healthy (19 Breakfast)


Iv Push Med Admin Ed (19 )





Medications Given in ED





Vital Signs/I&O











Blood Pressure Mean:  91











Progress


Progress Note :  


Progress Note


Patient was given aspirin and Toradol.  Pain did improve.  Troponin and EKG 

were negative.  A repeat troponin at 6 hours was drawn and EKG repeated.  They 

were also negative.  Case was discussed with Dr. Tran who requested 6 hour 

rule out.  Patient was ultimately discharged in stable condition.


Initial ECG Impression Date:  2019


Initial ECG Impression Time:  08:31


Initial ECG Rate:  98


Comment


Sinus rhythm with no ST elevation or depression.  No abnormal rhythms or axis 

deviation.  Borderline tachycardia.


EKG :  


   EKG Time:  13:55


   Rhythm:  Normal Sinus


   Intervals:  Normal


   ECG Impression:  Normal


Comment


Normal sinus rhythm with no ST elevation or depression.  No abnormal intervals 

or axis deviation.





Diagnostic Imaging





   Diagonstic Imaging:  Xray


   Plain Films/CT/US/NM/MRI:  chest


Comments


Chest x-ray viewed by me and report reviewed.  See report below:





NAME:   TANIA WILLIAM  


[facility] REC#:   [id number]  


ACCOUNT#:   [id number]  


PT STATUS:   REG ER  


:   1970  


PHYSICIAN:   TEJAL MUNGUIA MD  


ADMIT DATE:   19/ER  


 ***Draft***  


Date of Exam:19  


 


CHEST 1 VIEW, AP/PA ONLY  


 


INDICATION: Left chest pain. History of COPD. TB.  


 


 Upright chest shows normal heart size and vascularity. There is  


 apical scarring on the right. No mass or alveolar infiltrate is  


 seen. There is no effusion or pneumothorax.  


 


 IMPRESSION: No acute abnormality is seen. The chest is similar to  


 a prior study from 2017.  


 


   Dictated on workstation # [id number]  


 


Dict:   19 0935  


Trans:   19 0941  


[facility] [id number]  


 


Interpreted by:     SABRINA DEAN MD





Departure


Impression





 Primary Impression:  


 Neck pain on left side


 Additional Impression:  


 Left shoulder pain


 Qualified Codes:  M25.512 - Pain in left shoulder


Disposition:   HOME, SELF-CARE


Condition:  Improved





Departure-Patient Inst.


Referrals:  


ANDREW EWING DO (PCP/Family)


Primary Care Physician











TEJAL MUNGUIA MD 2019 09:45

## 2019-02-16 NOTE — DIAGNOSTIC IMAGING REPORT
INDICATION: Left chest pain. History of COPD. TB.



Upright chest shows normal heart size and vascularity. There is

apical scarring on the right. No mass or alveolar infiltrate is

seen. There is no effusion or pneumothorax.



IMPRESSION: No acute abnormality is seen. The chest is similar to

a prior study from 01/25/2017.



Dictated by: 



  Dictated on workstation # [id number]

## 2019-02-20 ENCOUNTER — HOSPITAL ENCOUNTER (OUTPATIENT)
Dept: HOSPITAL 75 - CARD | Age: 49
End: 2019-02-20
Attending: PHYSICIAN ASSISTANT
Payer: COMMERCIAL

## 2019-02-20 VITALS — SYSTOLIC BLOOD PRESSURE: 140 MMHG | DIASTOLIC BLOOD PRESSURE: 81 MMHG

## 2019-02-20 VITALS — SYSTOLIC BLOOD PRESSURE: 133 MMHG | DIASTOLIC BLOOD PRESSURE: 75 MMHG

## 2019-02-20 DIAGNOSIS — I10: ICD-10-CM

## 2019-02-20 DIAGNOSIS — Z72.0: ICD-10-CM

## 2019-02-20 DIAGNOSIS — I25.10: Primary | ICD-10-CM

## 2019-02-20 DIAGNOSIS — I77.1: ICD-10-CM

## 2019-02-20 PROCEDURE — 78452 HT MUSCLE IMAGE SPECT MULT: CPT

## 2019-02-20 PROCEDURE — 93017 CV STRESS TEST TRACING ONLY: CPT

## 2019-02-20 NOTE — STRESS TEST
DATE OF SERVICE:  02/20/2019



LEXISCAN MYOVIEW STRESS TEST REPORT



REFERRING PHYSICIAN:

Mani Tristan DO.



Baseline heart rate is 78.  Baseline blood pressure is 121/75.  Baseline EKG is

sinus rhythm with no ischemic changes.



In summary, the patient was injected with 10.92 mCi of technetium-99 Myoview and

the resting images were obtained.  Then, the patient received 0.4 mg of Lexiscan

followed by 30.4 mCi of technetium-99 Myoview.  Throughout the test, there were

no EKG changes.



The resting and stress images were reviewed and compared in the short axis,

horizontal long axis, and vertical long axis views.  Review of the images showed

good radiotracer uptake with no significant ischemia or infarction.  SSS is 0. 

TID value 1.08.  On the gated images, the left ventricle appeared to be in

normal size with normal contractility.  Calculated ejection fraction is 60%.



CONCLUSION:

1.  The patient tolerated Lexiscan well.

2.  No ischemia or infarction on SPECT images.

3.  Normal left ventricular size with normal contractility.  Calculated ejection

fraction is 60%.





Job ID: [id number]

DocumentID: [id number]

Dictated Date:  02/20/2019 15:30:42

Transcription Date: 02/20/2019 16:09:27

Dictated By: BRAULIO REED MD

## 2019-02-26 ENCOUNTER — HOSPITAL ENCOUNTER (OUTPATIENT)
Dept: HOSPITAL 75 - PREOP | Age: 49
End: 2019-02-26
Attending: SURGERY
Payer: COMMERCIAL

## 2019-02-26 VITALS — HEIGHT: 66 IN | BODY MASS INDEX: 28.61 KG/M2 | WEIGHT: 178 LBS

## 2019-02-26 DIAGNOSIS — Z01.818: Primary | ICD-10-CM

## 2019-03-05 ENCOUNTER — HOSPITAL ENCOUNTER (OUTPATIENT)
Dept: HOSPITAL 75 - ENDO | Age: 49
Discharge: HOME | End: 2019-03-05
Attending: SURGERY
Payer: COMMERCIAL

## 2019-03-05 VITALS — HEIGHT: 66 IN | BODY MASS INDEX: 28.61 KG/M2 | WEIGHT: 178 LBS

## 2019-03-05 VITALS — SYSTOLIC BLOOD PRESSURE: 141 MMHG | DIASTOLIC BLOOD PRESSURE: 87 MMHG

## 2019-03-05 VITALS — DIASTOLIC BLOOD PRESSURE: 72 MMHG | SYSTOLIC BLOOD PRESSURE: 119 MMHG

## 2019-03-05 VITALS — DIASTOLIC BLOOD PRESSURE: 74 MMHG | SYSTOLIC BLOOD PRESSURE: 138 MMHG

## 2019-03-05 DIAGNOSIS — I65.21: ICD-10-CM

## 2019-03-05 DIAGNOSIS — Z95.820: ICD-10-CM

## 2019-03-05 DIAGNOSIS — I25.10: ICD-10-CM

## 2019-03-05 DIAGNOSIS — Z79.02: ICD-10-CM

## 2019-03-05 DIAGNOSIS — I73.9: ICD-10-CM

## 2019-03-05 DIAGNOSIS — Z87.891: ICD-10-CM

## 2019-03-05 DIAGNOSIS — D12.2: Primary | ICD-10-CM

## 2019-03-05 DIAGNOSIS — I10: ICD-10-CM

## 2019-03-05 DIAGNOSIS — J44.9: ICD-10-CM

## 2019-03-05 DIAGNOSIS — D12.4: ICD-10-CM

## 2019-03-05 DIAGNOSIS — Z79.82: ICD-10-CM

## 2019-03-05 DIAGNOSIS — Z79.899: ICD-10-CM

## 2019-03-05 PROCEDURE — 84703 CHORIONIC GONADOTROPIN ASSAY: CPT

## 2019-03-05 NOTE — PROGRESS NOTE-PRE OPERATIVE
Pre-Operative Progress Note


H&P Reviewed


The H&P was reviewed, patient examined and no changes noted.


Date Seen by Provider:  Mar 5, 2019


Time Seen by Provider:  09:28


Date H&P Reviewed:  Mar 5, 2019


Time H&P Reviewed:  09:28


Pre-Operative Diagnosis:  + DANIELA Watson DO Mar 5, 2019 09:29

## 2019-03-05 NOTE — PROGRESS NOTE-POST OPERATIVE
Post-Operative Progess Note


Surgeon (s)/Assistant (s)


Surgeon


DANIELA CUNNINGHAM DO


Assistant:  na





Pre-Operative Diagnosis


+ cologuard





Post-Operative Diagnosis





colon polyps x 5





Procedure & Operative Findings


Date of Procedure


3/5/19


Procedure Performed/Findings


colonoscopy c hot bx polypectomy x 5


Anesthesia Type


per crna





Estimated Blood Loss


Estimated blood loss (mL):  none





Specimens/Packing


Specimens Removed


colon polyps











DANIELA CUNNINGHAM DO Mar 5, 2019 10:47

## 2019-03-05 NOTE — OPERATIVE REPORT
DATE OF SERVICE:  03/05/2019



PREOPERATIVE DIAGNOSIS:

Positive Cologuard test.



POSTOPERATIVE DIAGNOSIS:

Colon polyps x 5.



PROCEDURE:

Colonoscopy with hot biopsy polypectomy x 5.



SURGEON:

Daniela Rqoue DO.



ANESTHESIA:

Per CRNA.



ESTIMATED BLOOD LOSS:

None.



COMPLICATIONS:

None.



INDICATIONS:

The patient is a 48-year-old female who has had a positive culture test.  She

understands risks and benefits of procedure and wished to proceed with

procedure.  Consent was signed in the chart.



PROCEDURE:

The patient was taken to the endoscopy suite and placed in the left lateral

recumbent position.  Timeout was performed.  Digital rectal exam was performed. 

There were no palpable polyps, masses or ulcerations.  Scope was inserted in the

rectum and advanced all the way to the cecum with minimal difficulty.  There

were no polyps, masses or ulcerations in the cecum.  In the _____, two small

polyps were present, hot biopsy polypectomy was performed on these.  Scope was

then continued to slowly be retracted back.  There were no polyps, masses or

ulcerations in the transverse colon.  In the descending colon, one small colon

polyp was present, which hot biopsy polypectomy was performed.  Scope was

continuously retracted back into the sigmoid colon, which there are two small

polyps in the sigmoid colon, which hot biopsy polypectomy was performed.  Scope

was slowly retracted back in the rectum, where it was also retroflexed noting no

other pathology.  Scope was returned to its normal position, slowly withdrawn

until completely removed.  The patient tolerated the procedure well without any

complications.  She was taken to recovery room in stable condition.



RECOMMENDATIONS:

The patient will need repeat colonoscopy in 3 years.  I will have her follow up

in the office in 2 weeks to discuss pathology.  If any issues before this, be

seen at that time.





Job ID: [id number]

DocumentID: [id number]

Dictated Date:  03/05/2019 10:52:48

Transcription Date: 03/05/2019 13:00:21

Dictated By: DANIELA RQOUE DO

## 2019-03-05 NOTE — DISCHARGE INST-SIMPLE/STANDARD
Discharge Inst-Standard


Patient Instructions/Follow Up


Plan of Care/Instructions/FU:  


2 weeks juanjo





Restart Plavix in 3 days.


Activity as Tolerated:  Yes


Discharge Diet:  Regular Diet











DANIELA CUNNINGHAM DO Mar 5, 2019 10:49

## 2019-03-05 NOTE — ANESTHESIA-GENERAL POST-OP
MAC


Patient Condition


Mental Status/LOC:  Same as Preop


Cardiovascular:  Satisfactory


Nausea/Vomiting:  Absent


Respiratory:  Satisfactory


Pain:  Controlled


Complications:  Absent





Post Op Complications


Complications


None





Follow Up Care/Instructions


Patient Instructions


None needed.





Anesthesiology Discharge Order


Discharge Order


Patient is doing well, no complaints, stable vital signs, no apparent adverse 

anesthesia problems.   


No complications reported per nursing.











GLORIA VALDEZ CRNA Mar 5, 2019 14:23

## 2019-04-05 NOTE — XMS REPORT
Continuity of Care Document

 Created on: 10/03/2016



TANIA WILLIAM

External Reference #: [id number]

: 1970

Sex: Female



Demographics







 Address  [address] [place]  [address]

 

 Home Phone  [phone]

 

 Preferred Language  English

 

 Marital Status  Unknown

 

 Catholic Affiliation  Unknown

 

 Race  Unknown

 

 Ethnic Group  Unknown





Author







 Author  [facility]

 

 Organization  [facility]

 

 Address  Unknown

 

 Phone  Unavailable







Support







 Name  Relationship  Address  Phone

 

 DEEPAK LEE APRN  Caregiver  [address] [place]  [address] [phone]

 

 ANDREW EWING DO  Caregiver  [address] [place]  [address] [phone]

 

 GENEVIEVE WILLIAM  Next Of Kin  [address] [place]  [address] [phone]







Care Team Providers







 Care Team Member Name  Role  Phone

 

 ANDREW EWING DO  PCP  [phone]







Insurance Providers







 Payer Name  Policy Number  Subscriber Name  Relationship

 

 [employer]  [id number]  Tania William  16 Self / Same As Patient







Advance Directives







 Directive  Response  Recorded Date/Time

 

 Advance Directives  No  16 11:49am

 

 Health Care Power of   No  16 11:49am

 

 Organ Donor  No  16 11:49am







Problems

Active Problems







 Medical Problem  Onset Date  Status

 

 Raynauds syndrome  Unknown  Acute







Medications

Current Home Medications







 Medication  Dose  Units  Route  Directions  Days/Qty  Instructions  Start Date

 

 Tiotropium Bromide 4 Gm  2  Puff  Inhalation  Once as needed for Daily        
16

 

 Budesonide/Formoterol Fumarate 10.2 Gm  1  Puff  Inhalation  Daily        

 

 Aspirin 81 Mg  81  Mg  Oral  Daily        16

 

 Meclizine Hcl 25 Mg  25  Mg  Oral  Twice A Day as needed for Vertigo        

 

 Naproxen 500 Mg  500  Mg  Oral  Twice A Day With Meals        16

 

 Gabapentin 300 Mg  300  Mg  Oral  Twice A Day        16

 

 Loratadine 10 Mg  10  Mg  Oral  Daily        16

 

 Amlodipine Besylate 10 Mg  10  Mg  Oral  Daily        16

 

 Povidone-Iodine 59 Ml        Topical  Twice A Day     FOR USE ON LEFT HAND 
MIDDLE FINGER  16

 

 Clopidogrel Bisulfate 75 Mg  75  Mg  Oral  Daily  16

 

 Pantoprazole Sodium 40 Mg  40  Mg  Oral  Daily  16





Past Home Medications







 Medication  Directions  Ordered  Status

 

 Methylprednisolone 4 Mg/Dose-Pack Tab.ds.pk, 0  Oral  As Directed  11/10/11  
Discontinued

 

 Amlodipine Besylate 2.5 Mg Tablet, 2.5 Mg Oral  Daily  16  Discontinued







Social History







 Social History Problem  Response  Recorded Date/Time

 

 Alcohol Use  Denies Use  2016 9:24pm

 

 Recreational Drug Use  No  2016 9:24pm

 

 Recent Foreign Travel  No  2016 1:04pm

 

 Type Used  Cigarettes  2016 12:03pm







Hospital Discharge Instructions

No hospital discharge instructions.



Plan of Care







 Prescriptions  See Medication Section







Functional Status

No functional status results.



Allergies, Adverse Reactions, Alerts







 Allergen  Type  Severity  Reaction  Status  Last Updated

 

 oxycodone HCl  Allergy  Severe  SOA, RASH  Active  11/10/11

 

 Erythromycin Lactobionate  Allergy  Severe  SOA, RASH  Active  11/10/11

 

 Sulfa (Sulfonamide Antibiotics) ([id number])  Allergy  Unknown     Active  

 

 Codeine  Allergy  Unknown     Active  07

 

 Acetaminophen  Allergy  Severe  SOA, RASH  Active  11/10/11

 

 methocarbamol ([id number])  Allergy  Severe  SOA, RASH  Active  11/10/11







Immunizations

No immunization records.



Vital Signs

No known vital signs results.



Results

No known relevant diagnostic tests, laboratory data and/or discharge summary.



Procedures

No known history of procedures.



Encounters







 Encounter  Location  Arrival/Admit Date  Discharge/Depart Date  Attending 
Provider

 

 Discharged Recurring  [facility]  16 3:08pm  10/03/
16 9:17am  DEEPAK LEE ARLENE

## 2019-06-18 ENCOUNTER — HOSPITAL ENCOUNTER (EMERGENCY)
Dept: HOSPITAL 75 - ER | Age: 49
Discharge: HOME | End: 2019-06-18
Payer: COMMERCIAL

## 2019-06-18 VITALS — HEIGHT: 66 IN | BODY MASS INDEX: 29.57 KG/M2 | WEIGHT: 184 LBS

## 2019-06-18 VITALS — DIASTOLIC BLOOD PRESSURE: 78 MMHG | SYSTOLIC BLOOD PRESSURE: 122 MMHG

## 2019-06-18 DIAGNOSIS — I25.10: ICD-10-CM

## 2019-06-18 DIAGNOSIS — Z79.02: ICD-10-CM

## 2019-06-18 DIAGNOSIS — I10: ICD-10-CM

## 2019-06-18 DIAGNOSIS — Z87.891: ICD-10-CM

## 2019-06-18 DIAGNOSIS — Z90.89: ICD-10-CM

## 2019-06-18 DIAGNOSIS — J44.9: ICD-10-CM

## 2019-06-18 DIAGNOSIS — Z88.0: ICD-10-CM

## 2019-06-18 DIAGNOSIS — Z88.8: ICD-10-CM

## 2019-06-18 DIAGNOSIS — I73.9: ICD-10-CM

## 2019-06-18 DIAGNOSIS — R10.31: Primary | ICD-10-CM

## 2019-06-18 DIAGNOSIS — Z91.041: ICD-10-CM

## 2019-06-18 DIAGNOSIS — Z88.5: ICD-10-CM

## 2019-06-18 DIAGNOSIS — Z98.51: ICD-10-CM

## 2019-06-18 DIAGNOSIS — Z79.82: ICD-10-CM

## 2019-06-18 LAB
ALBUMIN SERPL-MCNC: 4.6 GM/DL (ref 3.2–4.5)
ALP SERPL-CCNC: 71 U/L (ref 40–136)
ALT SERPL-CCNC: 11 U/L (ref 0–55)
APTT PPP: YELLOW S
BACTERIA #/AREA URNS HPF: (no result) /HPF
BASOPHILS # BLD AUTO: 0.1 10^3/UL (ref 0–0.1)
BASOPHILS NFR BLD AUTO: 0 % (ref 0–10)
BILIRUB SERPL-MCNC: 0.5 MG/DL (ref 0.1–1)
BILIRUB UR QL STRIP: NEGATIVE
BUN/CREAT SERPL: 12
CALCIUM SERPL-MCNC: 9.2 MG/DL (ref 8.5–10.1)
CHLORIDE SERPL-SCNC: 108 MMOL/L (ref 98–107)
CO2 SERPL-SCNC: 17 MMOL/L (ref 21–32)
CREAT SERPL-MCNC: 0.85 MG/DL (ref 0.6–1.3)
EOSINOPHIL # BLD AUTO: 0.1 10^3/UL (ref 0–0.3)
EOSINOPHIL NFR BLD AUTO: 1 % (ref 0–10)
ERYTHROCYTE [DISTWIDTH] IN BLOOD BY AUTOMATED COUNT: 13.5 % (ref 10–14.5)
FIBRINOGEN PPP-MCNC: CLEAR MG/DL
GFR SERPLBLD BASED ON 1.73 SQ M-ARVRAT: > 60 ML/MIN
GLUCOSE SERPL-MCNC: 121 MG/DL (ref 70–105)
GLUCOSE UR STRIP-MCNC: (no result) MG/DL
HCT VFR BLD CALC: 42 % (ref 35–52)
HGB BLD-MCNC: 13.3 G/DL (ref 11.5–16)
KETONES UR QL STRIP: NEGATIVE
LEUKOCYTE ESTERASE UR QL STRIP: (no result)
LYMPHOCYTES # BLD AUTO: 2.6 X 10^3 (ref 1–4)
LYMPHOCYTES NFR BLD AUTO: 20 % (ref 12–44)
MANUAL DIFFERENTIAL PERFORMED BLD QL: NO
MCH RBC QN AUTO: 30 PG (ref 25–34)
MCHC RBC AUTO-ENTMCNC: 32 G/DL (ref 32–36)
MCV RBC AUTO: 94 FL (ref 80–99)
MONOCYTES # BLD AUTO: 0.7 X 10^3 (ref 0–1)
MONOCYTES NFR BLD AUTO: 5 % (ref 0–12)
NEUTROPHILS # BLD AUTO: 10 X 10^3 (ref 1.8–7.8)
NEUTROPHILS NFR BLD AUTO: 74 % (ref 42–75)
NITRITE UR QL STRIP: NEGATIVE
PH UR STRIP: 6 [PH] (ref 5–9)
PLATELET # BLD: 445 10^3/UL (ref 130–400)
PMV BLD AUTO: 10.3 FL (ref 7.4–10.4)
POTASSIUM SERPL-SCNC: 4.1 MMOL/L (ref 3.6–5)
PROT SERPL-MCNC: 7.9 GM/DL (ref 6.4–8.2)
PROT UR QL STRIP: NEGATIVE
RBC #/AREA URNS HPF: (no result) /HPF
SODIUM SERPL-SCNC: 137 MMOL/L (ref 135–145)
SP GR UR STRIP: 1.02 (ref 1.02–1.02)
UROBILINOGEN UR-MCNC: NORMAL MG/DL
WBC # BLD AUTO: 13.4 10^3/UL (ref 4.3–11)
WBC #/AREA URNS HPF: (no result) /HPF

## 2019-06-18 PROCEDURE — 80053 COMPREHEN METABOLIC PANEL: CPT

## 2019-06-18 PROCEDURE — 96374 THER/PROPH/DIAG INJ IV PUSH: CPT

## 2019-06-18 PROCEDURE — 36415 COLL VENOUS BLD VENIPUNCTURE: CPT

## 2019-06-18 PROCEDURE — 85025 COMPLETE CBC W/AUTO DIFF WBC: CPT

## 2019-06-18 PROCEDURE — 81000 URINALYSIS NONAUTO W/SCOPE: CPT

## 2019-06-18 PROCEDURE — 74176 CT ABD & PELVIS W/O CONTRAST: CPT

## 2019-06-18 NOTE — DIAGNOSTIC IMAGING REPORT
PROCEDURE: 

CT urinary tract, rule out kidney stone.



TECHNIQUE: 

Multiple contiguous axial images were obtained through the

abdomen and pelvis without the use of intravenous contrast. Auto

Exposure Controls were utilized during the CT exam to meet ALARA

standards for radiation dose reduction. 



DATE: 

June 18, 2019.



COMPARISON: 

MRI pelvis of Jazmine 3, 2013. 



INDICATION: 

48-year-old female, right lower quadrant abdominal pain.



FINDINGS: 

There are limitations for evaluation of the abdominal organs,

neoplastic processes, abscess, and limited evaluation of the

vasculature relating to the lack of intravenous contrast. 



There are tubular opacities in the right lower lobe with adjacent

areas of hyperlucent lung on axial images 6 and 7 as well as

adjacent images. These measure up to maximally 8 mm in size and

are calcified. There is also a high attenuation calcified right

lower lobe granuloma on axial image 8. There are additional small

calcified granulomas present. There are mild peripheral cystic

changes in the left lower lobe as well as in the dependent aspect

of the right lower lobe as well. The heart is not enlarged. There

is no pericardial effusion. The appearance of the lung bases is

essentially unchanged since the August 30, 2017 CT chest.



The liver is normal in size and contour. The gallbladder is

unremarkable. There is no intrahepatic or extrahepatic bile duct

dilation. The main pancreatic duct is not abnormally dilated.

Limited noncontrast evaluation of the pancreatic parenchyma is

unremarkable. The spleen is normal in size. The adrenal glands

are unremarkable.



Unremarkable appearance of the renal parenchyma. There is a

punctate 1-2 mm nonobstructing left renal stone on axial image

43. There is no identified ureteral stone. The urinary collecting

systems are not distended. The urinary bladder is underdistended

and not well evaluated.



There is a low-attenuation lesion in the right adnexa on axial

image 96 measuring 1.3 cm in size, most likely reflecting a right

ovarian follicle. There are surgical clips in the right and left

adnexa.



The intestinal tract is not distended. The appendix is well seen

on axial image 75 and adjacent sequential images. There is no

evidence of acute appendicitis. There is a small diverticulum at

the level of the third portion of the duodenum. There is no free

intraperitoneal air. There is no drainable fluid collection.

There is no sizable volume of free pelvic fluid.



There are atherosclerotic calcifications. There is no identified

abnormally enlarged lymph node in the abdomen or pelvis which

meets CT size criteria for adenopathy.



There is grade 1 anterolisthesis of L5 on S1 relating to facet

degenerative changes. There are additional degenerative changes

of the thoracolumbar spine. There is no identified acute bony

abnormality.



IMPRESSION:  

1. Punctate 1-2 mm nonobstructing left renal stone. No ureteral

stone or hydronephrosis.

2. No evidence of acute appendicitis.

3. No identified acute abnormality in the abdomen or pelvis.

4. Sequela of prior granulomatous disease with unchanged cystic

foci in the lung bases.











Dictated by: 



  Dictated on workstation # [id number]

## 2019-06-18 NOTE — ED ABDOMINAL PAIN
General


Chief Complaint:  Abdominal/GI Problems


Stated Complaint:  R SIDE PAIN


Nursing Triage Note:  


AMB TO ROOM WAS AT WORK  ONSET OF R LOWER QUAD PAIN. NO NAUSEA OR VOMITING .


Sepsis Screen:  No Definite Risk


Source of Information:  Patient


Exam Limitations:  No Limitations





History of Present Illness


Date Seen by Provider:  Jun 18, 2019


Time Seen by Provider:  11:43


Initial Comments


To ER per private vehicle with reports of sudden onset right periumbilical/flank

pain 40 minutes prior to arrival. Since onset the pain has moved more medial. No

nausea or vomiting. No fevers or chills.


Timing/Duration:  1/2 Hour


Severity/Quality:  Moderate


Location:  [facility]


Radiation:  No Radiation


Activities at Onset:  None


Associated Symptoms:  Nausea/Vomiting





Allergies and Home Medications


Allergies


Coded Allergies:  


     Erythromycin Lactobionate (Verified  Allergy, Severe, SOA, RASH, 3/5/19)


     acetaminophen (Verified  Allergy, Severe, SOA, RASH, 3/5/19)


     methocarbamol (Verified  Allergy, Severe, SOA, RASH, 3/5/19)


     oxycodone HCl (Verified  Allergy, Severe, SOA, RASH, 3/5/19)


     Sulfa (Sulfonamide Antibiotics) (Verified  Allergy, Unknown, 1/22/07)


     codeine (Verified  Allergy, Unknown, 1/22/07)





Home Medications


Acetaminophen 500 Mg Tablet, 1,000 MG PO Q6H PRN for HEADACHE, (Reported)


Amlodipine Besylate 10 Mg Tablet, 10 MG PO DAILY, (Reported)


Aspirin 81 Mg Tablet.dr, 81 MG PO DAILY, (Reported)


Atorvastatin Calcium 20 Mg Tablet, 20 MG PO HS, (Reported)


Budesonide/Formoterol Fumarate 10.2 Gm Hfa.aer.ad, 1 PUFF IH DAILY, (Reported)


Clopidogrel Bisulfate 75 Mg Tablet, 75 MG PO DAILY, (Reported)


Cyclobenzaprine HCl 10 Mg Tablet, 10 MG PO TID, (Reported)


Lisinopril 5 Mg Tablet, 5 MG PO DAILY, (Reported)


Loratadine 10 Mg Tablet, 10 MG PO DAILY, (Reported)


Meclizine HCl 25 Mg Tablet, 25 MG PO TID, (Reported)


Omega 3 Polyunsat Fatty Acids 1,000 Mg Cap, 1,000 MG PO BID, (Reported)


Pantoprazole Sodium 40 Mg Tablet.dr, 40 MG PO DAILY, (Reported)


Tiotropium Bromide 4 Gm Mist.inhal, 2 PUFF IH DAILY, (Reported)





Patient Home Medication List


Home Medication List Reviewed:  Yes





Review of Systems


Review of Systems


Constitutional:  see HPI


EENTM:  No Symptoms Reported


Respiratory:  No Symptoms Reported


Cardiovascular:  No Symptoms Reported


Gastrointestinal:  See HPI, Abdominal Pain


Genitourinary:  No Symptoms Reported


Musculoskeletal:  no symptoms reported


Skin:  no symptoms reported


Psychiatric/Neurological:  No Symptoms Reported


Endocrine:  No Symptoms Reported


Hematologic/Lymphatic:  No Symptoms Reported





Past Medical-Social-Family Hx


Patient Social History


Alcohol Use:  Denies Use


Recreational Drug Use:  No


Smoking Status:  Current Someday Smoker


Type Used:  Cigarettes


Former Smoker, Quit:  Jan 26, 2019


Recent Foreign Travel:  No


Contact w/Someone Who Travel:  No


Recent Infectious Disease Expo:  No


Recent Hopitalizations:  No





Immunizations Up To Date


Date of Influenza Vaccine:  Oct 12, 2018





Seasonal Allergies


Seasonal Allergies:  Yes





Past Medical History


Surgeries:  Yes


Tonsillectomy, Tubal Ligation, Vascular Surgery


Respiratory:  Yes


COPD


Cardiac:  Yes


Coronary Artery Disease, Hypertension, Peripheral Vascular


Neurological:  No (vertigo)


Reproductive Disorders:  No


GYN History:  Tubal Ligation


Genitourinary:  No


Gastrointestinal:  No


Musculoskeletal:  Yes


Arthritis


Endocrine:  No


HEENT:  No


Cancer:  No


Psychosocial:  No


Integumentary:  No


Blood Disorders:  No





Physical Exam


Vital Signs





Vital Signs - First Documented








 6/18/19





 11:26


 


Temp 97.6


 


Pulse 127


 


Resp 18


 


B/P (MAP) 167/91 (116)


 


Pulse Ox 97


 


O2 Delivery Room Air





Capillary Refill : Less Than 3 Seconds


Height/Weight/BMI


Height: 5'6.00"


Weight: 184lbs. 0.0oz. [id number]; 28.7 BMI


Method:Stated


General Appearance:  WD/WN, no apparent distress


HEENT:  PERRL/EOMI, normal ENT inspection


Respiratory:  no respiratory distress, no accessory muscle use


Cardiovascular:  regular rate, rhythm, no murmur


Gastrointestinal:  normal bowel sounds, soft, guarding, tenderness


Extremities:  normal range of motion, non-tender


Neurologic/Psychiatric:  alert, normal mood/affect, oriented x 3


Skin:  normal color, warm/dry





Progress/Results/Core Measures


Results/Orders


Lab Results





Laboratory Tests








Test


 6/18/19


11:30 6/18/19


11:35 Range/Units


 


 


White Blood Count


 13.4 H


 


 4.3-11.0


10^3/uL


 


Red Blood Count


 4.43 


 


 4.35-5.85


10^6/uL


 


Hemoglobin 13.3   11.5-16.0  G/DL


 


Hematocrit 42   35-52  %


 


Mean Corpuscular Volume 94   80-99  FL


 


Mean Corpuscular Hemoglobin 30   25-34  PG


 


Mean Corpuscular Hemoglobin


Concent 32 


 


 32-36  G/DL





 


Red Cell Distribution Width 13.5   10.0-14.5  %


 


Platelet Count


 445 H


 


 130-400


10^3/uL


 


Mean Platelet Volume 10.3   7.4-10.4  FL


 


Neutrophils (%) (Auto) 74   42-75  %


 


Lymphocytes (%) (Auto) 20   12-44  %


 


Monocytes (%) (Auto) 5   0-12  %


 


Eosinophils (%) (Auto) 1   0-10  %


 


Basophils (%) (Auto) 0   0-10  %


 


Neutrophils # (Auto) 10.0 H  1.8-7.8  X 10^3


 


Lymphocytes # (Auto) 2.6   1.0-4.0  X 10^3


 


Monocytes # (Auto) 0.7   0.0-1.0  X 10^3


 


Eosinophils # (Auto)


 0.1 


 


 0.0-0.3


10^3/uL


 


Basophils # (Auto)


 0.1 


 


 0.0-0.1


10^3/uL


 


Sodium Level 137   135-145  MMOL/L


 


Potassium Level 4.1   3.6-5.0  MMOL/L


 


Chloride Level 108 H    MMOL/L


 


Carbon Dioxide Level 17 L  21-32  MMOL/L


 


Anion Gap 12   5-14  MMOL/L


 


Blood Urea Nitrogen 10   7-18  MG/DL


 


Creatinine


 0.85 


 


 0.60-1.30


MG/DL


 


Estimat Glomerular Filtration


Rate > 60 


 


  





 


BUN/Creatinine Ratio 12    


 


Glucose Level 121 H    MG/DL


 


Calcium Level 9.2   8.5-10.1  MG/DL


 


Corrected Calcium    8.5-10.1  MG/DL


 


Total Bilirubin 0.5   0.1-1.0  MG/DL


 


Aspartate Amino Transf


(AST/SGOT) 13 


 


 5-34  U/L





 


Alanine Aminotransferase


(ALT/SGPT) 11 


 


 0-55  U/L





 


Alkaline Phosphatase 71     U/L


 


Total Protein 7.9   6.4-8.2  GM/DL


 


Albumin 4.6 H  3.2-4.5  GM/DL


 


Urine Color  YELLOW   


 


Urine Clarity  CLEAR   


 


Urine pH  6  5-9  


 


Urine Specific Gravity  1.020  1.016-1.022  


 


Urine Protein  NEGATIVE  NEGATIVE  


 


Urine Glucose (UA)  1+ H NEGATIVE  


 


Urine Ketones  NEGATIVE  NEGATIVE  


 


Urine Nitrite  NEGATIVE  NEGATIVE  


 


Urine Bilirubin  NEGATIVE  NEGATIVE  


 


Urine Urobilinogen  NORMAL  NORMAL  MG/DL


 


Urine Leukocyte Esterase  1+ H NEGATIVE  


 


Urine RBC (Auto)  1+ H NEGATIVE  


 


Urine RBC  RARE   /HPF


 


Urine WBC  2-5   /HPF


 


Urine Squamous Epithelial


Cells 


 10-25 H


  /HPF





 


Urine Crystals  NONE   /LPF


 


Urine Bacteria  FEW H  /HPF


 


Urine Casts  NONE   /LPF


 


Urine Mucus  SMALL H  /LPF


 


Urine Culture Indicated  NO   








My Orders





Orders - HERNAN BUCKNER


Cbc With Automated Diff (6/18/19 11:33)


Comprehensive Metabolic Panel (6/18/19 11:33)


Ua Culture If Indicated (6/18/19 11:33)


Ed Iv/Invasive Line Start (6/18/19 11:33)


Ketorolac Injection (Toradol Injection) (6/18/19 11:45)


Ct Abd/Pelvis Wo(Kidney Stone) (6/18/19 11:38)


Fentanyl  Injection (Sublimaze Injection (6/18/19 11:45)





Medications Given in ED





Current Medications








 Medications  Dose


 Ordered  Sig/Kylah


 Route  Start Time


 Stop Time Status Last Admin


Dose Admin


 


 Ketorolac


 Tromethamine  15 mg  ONCE  ONCE


 IVP  6/18/19 11:45


 6/18/19 11:46 DC 6/18/19 11:45


15 MG








Vital Signs/I&O











 6/18/19





 11:26


 


Temp 97.6


 


Pulse 127


 


Resp 18


 


B/P (MAP) 167/91 (116)


 


Pulse Ox 97


 


O2 Delivery Room Air














Blood Pressure Mean:                    116











Departure


Communication (Admissions)


1229-pain was 8 out of 10 on arrival, currently 2 out of 10 after 15 mg of 

Toradol IV.





Impression





   Primary Impression:  


   Abdominal pain


   Qualified Codes:  R10.31 - Right lower quadrant pain


Disposition:  01 HOME, SELF-CARE


Condition:  Improved





Departure-Patient Inst.


Decision time for Depature:  12:29


Referrals:  


ANDREW EWING DO (PCP/Family)


Primary Care Physician


Patient Instructions:  Acute Abdomen (Belly Pain), Adult (DC)





Add. Discharge Instructions:  


1. The cause of abdominal pain is not entirely clear. Return to ER for any 

fevers worsening pain or other concerns. Follow-up with your doctor within 1 

week for recheck. All discharge instructions reviewed with patient and/or 

family. Voiced understanding.


Work/School Note:  Work Release Form   Date Seen in the Emergency Department:  

Jun 18, 2019


   Return to Work:  Jun 19, 2019











HERNAN BUCKNER             Jun 18, 2019 11:44

## 2020-05-18 ENCOUNTER — HOSPITAL ENCOUNTER (OUTPATIENT)
Dept: HOSPITAL 75 - CARD | Age: 50
End: 2020-05-18
Attending: PHYSICIAN ASSISTANT
Payer: COMMERCIAL

## 2020-05-18 DIAGNOSIS — J44.9: ICD-10-CM

## 2020-05-18 DIAGNOSIS — R06.02: ICD-10-CM

## 2020-05-18 DIAGNOSIS — I25.10: Primary | ICD-10-CM

## 2020-05-18 DIAGNOSIS — I77.1: ICD-10-CM

## 2020-05-18 DIAGNOSIS — I73.9: ICD-10-CM

## 2020-05-18 DIAGNOSIS — I10: ICD-10-CM

## 2020-05-18 PROCEDURE — 93306 TTE W/DOPPLER COMPLETE: CPT

## 2022-04-26 ENCOUNTER — HOSPITAL ENCOUNTER (EMERGENCY)
Dept: HOSPITAL 75 - ER | Age: 52
Discharge: HOME | End: 2022-04-26
Payer: SELF-PAY

## 2022-04-26 VITALS — HEIGHT: 66.93 IN | WEIGHT: 174.17 LBS | BODY MASS INDEX: 27.34 KG/M2

## 2022-04-26 VITALS — SYSTOLIC BLOOD PRESSURE: 167 MMHG | DIASTOLIC BLOOD PRESSURE: 93 MMHG

## 2022-04-26 DIAGNOSIS — I10: ICD-10-CM

## 2022-04-26 DIAGNOSIS — Z79.02: ICD-10-CM

## 2022-04-26 DIAGNOSIS — M25.512: Primary | ICD-10-CM

## 2022-04-26 LAB
ALBUMIN SERPL-MCNC: 4.2 GM/DL (ref 3.2–4.5)
ALP SERPL-CCNC: 75 U/L (ref 40–136)
ALT SERPL-CCNC: 17 U/L (ref 0–55)
APTT BLD: 26 SEC (ref 24–35)
BASOPHILS # BLD AUTO: 0.1 10^3/UL (ref 0–0.1)
BASOPHILS NFR BLD AUTO: 1 % (ref 0–10)
BILIRUB SERPL-MCNC: 0.4 MG/DL (ref 0.1–1)
BUN/CREAT SERPL: 11
CALCIUM SERPL-MCNC: 9.5 MG/DL (ref 8.5–10.1)
CHLORIDE SERPL-SCNC: 103 MMOL/L (ref 98–107)
CO2 SERPL-SCNC: 22 MMOL/L (ref 21–32)
CREAT SERPL-MCNC: 0.93 MG/DL (ref 0.6–1.3)
EOSINOPHIL # BLD AUTO: 0.2 10^3/UL (ref 0–0.3)
EOSINOPHIL NFR BLD AUTO: 2 % (ref 0–10)
GFR SERPLBLD BASED ON 1.73 SQ M-ARVRAT: 73 ML/MIN
GLUCOSE SERPL-MCNC: 91 MG/DL (ref 70–105)
HCT VFR BLD CALC: 49 % (ref 35–52)
HGB BLD-MCNC: 15.3 G/DL (ref 11.5–16)
INR PPP: 1 (ref 0.8–1.4)
LYMPHOCYTES # BLD AUTO: 2.6 10^3/UL (ref 1–4)
LYMPHOCYTES NFR BLD AUTO: 27 % (ref 12–44)
MAGNESIUM SERPL-MCNC: 2.1 MG/DL (ref 1.6–2.4)
MANUAL DIFFERENTIAL PERFORMED BLD QL: NO
MCH RBC QN AUTO: 31 PG (ref 25–34)
MCHC RBC AUTO-ENTMCNC: 32 G/DL (ref 32–36)
MCV RBC AUTO: 97 FL (ref 80–99)
MONOCYTES # BLD AUTO: 0.5 10^3/UL (ref 0–1)
MONOCYTES NFR BLD AUTO: 5 % (ref 0–12)
NEUTROPHILS # BLD AUTO: 6.2 10^3/UL (ref 1.8–7.8)
NEUTROPHILS NFR BLD AUTO: 65 % (ref 42–75)
PLATELET # BLD: 371 10^3/UL (ref 130–400)
PMV BLD AUTO: 10.5 FL (ref 9–12.2)
POTASSIUM SERPL-SCNC: 3.5 MMOL/L (ref 3.6–5)
PROT SERPL-MCNC: 7.2 GM/DL (ref 6.4–8.2)
PROTHROMBIN TIME: 13.3 SEC (ref 12.2–14.7)
SODIUM SERPL-SCNC: 139 MMOL/L (ref 135–145)
WBC # BLD AUTO: 9.6 10^3/UL (ref 4.3–11)

## 2022-04-26 PROCEDURE — 93005 ELECTROCARDIOGRAM TRACING: CPT

## 2022-04-26 PROCEDURE — 85610 PROTHROMBIN TIME: CPT

## 2022-04-26 PROCEDURE — 85379 FIBRIN DEGRADATION QUANT: CPT

## 2022-04-26 PROCEDURE — 93041 RHYTHM ECG TRACING: CPT

## 2022-04-26 PROCEDURE — 84484 ASSAY OF TROPONIN QUANT: CPT

## 2022-04-26 PROCEDURE — 71275 CT ANGIOGRAPHY CHEST: CPT

## 2022-04-26 PROCEDURE — 36415 COLL VENOUS BLD VENIPUNCTURE: CPT

## 2022-04-26 PROCEDURE — 83874 ASSAY OF MYOGLOBIN: CPT

## 2022-04-26 PROCEDURE — 85025 COMPLETE CBC W/AUTO DIFF WBC: CPT

## 2022-04-26 PROCEDURE — 80053 COMPREHEN METABOLIC PANEL: CPT

## 2022-04-26 PROCEDURE — 71045 X-RAY EXAM CHEST 1 VIEW: CPT

## 2022-04-26 PROCEDURE — 83735 ASSAY OF MAGNESIUM: CPT

## 2022-04-26 PROCEDURE — 85730 THROMBOPLASTIN TIME PARTIAL: CPT

## 2022-04-26 NOTE — DIAGNOSTIC IMAGING REPORT
CHEST 1 VIEW, AP/PA ONLY



INDICATION: Chest pain. 



COMPARISON: CTA chest performed concurrently.



FINDINGS:

Opacities in the bilateral upper lobes correspond to areas of

atelectasis and scar seen on CT. Heart is normal in size. No

pleural effusion or pneumothorax.



IMPRESSION: 

1. Bilateral upper lobe atelectasis and/or scar.

2. No acute abnormality by portable radiography.



Dictated by: 



  Dictated on workstation # [id number] appropriate

## 2022-04-26 NOTE — ED CHEST PAIN
General


Chief Complaint:  Chest Wall


Stated Complaint:  NECK/UPPER CHEST PAIN


Source:  patient


Exam Limitations:  no limitations


 (RENETTA FINNEY)





History of Present Illness


Date Seen by Provider:  2022


Time Seen by Provider:  12:20


Initial Comments


This is a well-appearing 51-year-old female who presented to the ER from her 

primary care office Dr. Ewing.  She has been having intermittent sharp 

aching pain underneath her left clavicle for the past 3 to 4 days.  She does 

have a history of a left subclavian artery stent and is concerned this may be 

possibly occluded.  She had taken anticoagulants in the past however has not 

taken "in a long time".  She was also on dual antiplatelet medication with 

Plavix and aspirin however she stopped taking aspirin 2 months ago due to blood 

in her urine.  States that she has only been taking Plavix and she does take 

this daily as directed.  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

pain. Denies fever, chills, cough, chest pain, shortness of breath. 


 (RENETTA FINNEY)





Allergies and Home Medications


Allergies


Coded Allergies:  


     Erythromycin Lactobionate (Verified  Allergy, Severe, SOA, RASH, 3/5/19)


     acetaminophen (Verified  Allergy, Severe, SOA, RASH, 3/5/19)


     methocarbamol (Verified  Allergy, Severe, SOA, RASH, 3/5/19)


     oxycodone HCl (Verified  Allergy, Severe, SOA, RASH, 3/5/19)


     Sulfa (Sulfonamide Antibiotics) (Verified  Allergy, Unknown, 07)


     codeine (Verified  Allergy, Unknown, 07)





Patient Home Medication List


Home Medication List Reviewed:  Yes


 (RENETTA FINNEY)


Acetaminophen (Tylenol Extra Strength) 500 Mg Tablet, 1,000 MG PO Q6H PRN for 

HEADACHE, (Reported)


   Entered as Reported by: FIFI SHELL on 17 0924


Amlodipine Besylate (Amlodipine Besylate) 10 Mg Tablet, 10 MG PO DAILY, 

(Reported)


   Entered as Reported by: MATTHEW VERA on 19 1530


Amlodipine Besylate (Amlodipine Besylate) 10 Mg Tablet, 10 MG PO DAILY


   Prescribed by: RENETTA FINNEY on 22 1347


Aspirin (Aspirin EC) 81 Mg Tablet.dr, 81 MG PO DAILY, (Reported)


   Entered as Reported by: ELSI TOBIAS on 16 1226


Atorvastatin Calcium (Atorvastatin Calcium) 20 Mg Tablet, 20 MG PO HS, 

(Reported)


   Entered as Reported by: MATTHEW VERA on 19 1529


Budesonide/Formoterol Fumarate (Symbicort 160-4.5 Mcg Inhaler) 10.2 Gm 

Hfa.aer.ad, 1 PUFF IH DAILY, (Reported)


   Entered as Reported by: ELSI TOBIAS on 16 1226


Clopidogrel Bisulfate (Plavix) 75 Mg Tablet, 75 MG PO DAILY, (Reported)


   Entered as Reported by: FIFI SHELL on 17 0922


Cyclobenzaprine HCl (Cyclobenzaprine HCl) 10 Mg Tablet, 10 MG PO TID, (Reported)


   Entered as Reported by: MATTHEW VERA on 19 1529


Lisinopril (Lisinopril) 5 Mg Tablet, 5 MG PO DAILY, (Reported)


   Entered as Reported by: MATTHEW VERA on 19 1529


Lisinopril (Lisinopril) 5 Mg Tablet, 5 MG PO DAILY


   Prescribed by: RENETTA FINNEY on 22 1347


Loratadine (Loratadine) 10 Mg Tablet, 10 MG PO DAILY, (Reported)


   Entered as Reported by: MATTHEW VERA on 19 1529


Meclizine HCl (Meclizine HCl) 25 Mg Tablet, 25 MG PO TID, (Reported)


   Entered as Reported by: ELSI TOBIAS on 16 1226


Omega 3 Polyunsat Fatty Acids (Fish Oil 1,000 mg Capsule) 1,000 Mg Cap, 1,000 MG

PO BID, (Reported)


   Entered as Reported by: MATTHEW VERA on 19 1529


Pantoprazole Sodium (Protonix) 40 Mg Tablet.dr, 40 MG PO DAILY, (Reported)


   Entered as Reported by: FIFI SHELL on 17 0924


Tiotropium Bromide (Spiriva Respimat 2.5MCG/ACTUATION) 4 Gm Mist.inhal, 2 PUFF 

IH DAILY, (Reported)


   Entered as Reported by: ELSI TOBIAS on 16 1226





Review of Systems


Review of Systems


Constitutional:  no symptoms reported


EENTM:  No Symptoms Reported


Respiratory:  No Symptoms Reported


Cardiovascular:  No Symptoms Reported


Gastrointestinal:  No Symptoms Reported


Genitourinary:  No Symptoms Reported


Musculoskeletal:  see HPI


Skin:  no symptoms reported


Psychiatric/Neurological:  No Symptoms Reported


Endocrine:  No Symptoms Reported


Hematologic/Lymphatic:  No Symptoms Reported (RENETTA FINNEY)





Past Medical-Social-Family Hx


Seasonal Allergies


Seasonal Allergies:  Yes


 (RENETTA FINNEY)





Past Medical History


Surgeries:  Yes


Tonsillectomy, Tubal Ligation, Vascular Surgery


Respiratory:  Yes


COPD


Cardiac:  Yes


Coronary Artery Disease, Hypertension, Peripheral Vascular


Neurological:  No (vertigo)


Reproductive Disorders:  No


GYN History:  Tubal Ligation


Genitourinary:  No


Gastrointestinal:  No


Musculoskeletal:  Yes


Arthritis


Endocrine:  No


HEENT:  No


Cancer:  No


Psychosocial:  No


Integumentary:  No


Blood Disorders:  No


 (RENETTA FINNEY)





Physical Exam


Vital Signs





Vital Signs - First Documented








 22





 12:00


 


Temp 36.7


 


Pulse 80


 


Resp 18


 


B/P (MAP) 178/93 (121)


 


Pulse Ox 100








 (TEJAL MUNGUIA MD)


Vital Signs


Capillary Refill :  


 (RENETTA FINNEY)


Height, Weight, BMI


Height: 5'6.00"


Weight: 184lbs. 0.0oz. 83.613670ws; 28.7 BMI


Method:Stated


General Appearance:  No Apparent Distress, WD/WN


HEENT:  PERRL/EOMI, Normal ENT Inspection, Pharynx Normal, Moist Mucous 

Membranes


Neck:  Full Range of Motion, Normal Inspection, Non Tender, Supple


Respiratory:  Lungs Clear, Normal Breath Sounds, No Accessory Muscle Use, No 

Respiratory Distress


Cardiovascular:  Regular Rate, Rhythm, No Edema, No Gallop


Gastrointestinal:  No Organomegaly, Non Tender, Soft


Extremity:  Normal Capillary Refill (BUE), Normal Inspection (BUE), Normal Range

of Motion (BUE), Non Tender


Neurologic/Psychiatric:  Alert, Oriented x3, No Motor/Sensory Deficits, Normal 

Mood/Affect


Skin:  Normal Color, Warm/Dry (RENETTA FINNEY)





Progress/Results/Core Measures


Results/Orders


Lab Results





Laboratory Tests








Test


 4/26/22


12:15 Range/Units


 


 


White Blood Count


 9.6 


 4.3-11.0


10^3/uL


 


Red Blood Count


 5.02 


 3.80-5.11


10^6/uL


 


Hemoglobin 15.3  11.5-16.0  g/dL


 


Hematocrit 49  35-52  %


 


Mean Corpuscular Volume 97  80-99  fL


 


Mean Corpuscular Hemoglobin 31  25-34  pg


 


Mean Corpuscular Hemoglobin


Concent 32 


 32-36  g/dL





 


Red Cell Distribution Width 13.0  10.0-14.5  %


 


Platelet Count


 371 


 130-400


10^3/uL


 


Mean Platelet Volume 10.5  9.0-12.2  fL


 


Immature Granulocyte % (Auto) 1   %


 


Neutrophils (%) (Auto) 65  42-75  %


 


Lymphocytes (%) (Auto) 27  12-44  %


 


Monocytes (%) (Auto) 5  0-12  %


 


Eosinophils (%) (Auto) 2  0-10  %


 


Basophils (%) (Auto) 1  0-10  %


 


Neutrophils # (Auto)


 6.2 


 1.8-7.8


10^3/uL


 


Lymphocytes # (Auto)


 2.6 


 1.0-4.0


10^3/uL


 


Monocytes # (Auto)


 0.5 


 0.0-1.0


10^3/uL


 


Eosinophils # (Auto)


 0.2 


 0.0-0.3


10^3/uL


 


Basophils # (Auto)


 0.1 


 0.0-0.1


10^3/uL


 


Immature Granulocyte # (Auto)


 0.1 


 0.0-0.1


10^3/uL


 


Prothrombin Time 13.3  12.2-14.7  SEC


 


INR Comment 1.0  0.8-1.4  


 


Activated Partial


Thromboplast Time 26 


 24-35  SEC





 


D-Dimer


 <= 0.27 


 0.00-0.49


UG/ML


 


Sodium Level 139  135-145  MMOL/L


 


Potassium Level 3.5 L 3.6-5.0  MMOL/L


 


Chloride Level 103    MMOL/L


 


Carbon Dioxide Level 22  21-32  MMOL/L


 


Anion Gap 14  5-14  MMOL/L


 


Blood Urea Nitrogen 10  7-18  MG/DL


 


Creatinine


 0.93 


 0.60-1.30


MG/DL


 


Estimat Glomerular Filtration


Rate 73 


  





 


BUN/Creatinine Ratio 11   


 


Glucose Level 91    MG/DL


 


Calcium Level 9.5  8.5-10.1  MG/DL


 


Corrected Calcium 9.3  8.5-10.1  MG/DL


 


Magnesium Level 2.1  1.6-2.4  MG/DL


 


Total Bilirubin 0.4  0.1-1.0  MG/DL


 


Aspartate Amino Transf


(AST/SGOT) 13 


 5-34  U/L





 


Alanine Aminotransferase


(ALT/SGPT) 17 


 0-55  U/L





 


Alkaline Phosphatase 75    U/L


 


Myoglobin


 39.5 


 10.0-92.0


NG/ML


 


Troponin I < 0.028  <0.028  NG/ML


 


Total Protein 7.2  6.4-8.2  GM/DL


 


Albumin 4.2  3.2-4.5  GM/DL





 (TEJAL MUNGUIA MD)


Vital Signs/I&O











 22





 12:00 14:11


 


Temp 36.7 36.7


 


Pulse 80 80


 


Resp 18 18


 


B/P (MAP) 178/93 (121) 167/93


 


Pulse Ox 100 100





 (TEJAL MUNGUIA MD)





Progress


Progress Note :  


Progress Note


Upon arrival she is in no acute distress. Orders placed for labs, EKG, and CT 

angio chest. States that she has some tingling in her left hand but has not 

noticed this until Dr. Ewing specifically asked about it. 





Labs and imaging reviewed, all are unremarkable. CT does not specifically 

address patency of subclavian stent. Will review with radiologist. 





1325: Reviewed imaging with radiologist Dr. Oropeza, reports subclavian stent 

is patent. 


1331: Discussed case with Dr. Ordaz, would like patient to follow up with him 

next week. 





Reviewed discharge plan of care with patient and she agreeable with plan.   She 

is noted to be very hypertensive in the emergency department.  States that she 

used to take multiple antihypertensives however since she was unable to follow-

up with Dr. Ordaz she has not had any of them refilled. Updated Dr. Ordaz on 

blood pressure in ED, ok to restart previous Amlodipine and Lisinopril. 


 (RENETTA FINNEY APRARELY)


Initial ECG Impression Date:  2022


Initial ECG Impression Time:  12:13


Initial ECG Rate:  81


Initial ECG Rhythm:  Normal Sinus


Initial ECG Intervals:  Normal


Initial ECG Impression:  Nonspecific Changes


 (RENETTA FINNEY)





Diagnostic Imaging





   Diagonstic Imaging:  Xray


   Plain Films/CT/US/NM/MRI:  chest


Comments


[facility] [place], [place]





NAME:   TANIA WILLIAM


MED REC#:   [id number]


ACCOUNT#:   [id number]


PT STATUS:   DEP ER


:   1970


PHYSICIAN:   RENETTA FINNEY APRN


ADMIT DATE:   22/ER


***Signed***


Date of Exam:22





CHEST 1 VIEW, AP/PA ONLY








CHEST 1 VIEW, AP/PA ONLY





INDICATION: Chest pain. 





COMPARISON: CTA chest performed concurrently.





FINDINGS:


Opacities in the bilateral upper lobes correspond to areas of


atelectasis and scar seen on CT. Heart is normal in size. No


pleural effusion or pneumothorax.





IMPRESSION: 


1. Bilateral upper lobe atelectasis and/or scar.


2. No acute abnormality by portable radiography.





Dictated by: 





  Dictated on workstation # [id number]








Dict:   22 [id number]


Trans:   22 1421


 [id number]





Interpreted by:     REGINALDO OWENS MD


Electronically signed by: REGINALDO OWENS MD 22 1421


   Reviewed:  Reviewed by Me








   Diagonstic Imaging:  CT


   Plain Films/CT/US/NM/MRI:  chest


Comments


[facility] [place], [place]





NAME:   TANIA WILLIAM


MED REC#:   [id number]


ACCOUNT#:   [id number]


PT STATUS:   REG ER


:   1970


PHYSICIAN:   RENETTA FINNEY APRN


ADMIT DATE:   22/ER


***Signed***


Date of Exam:22





CT ANGIO CHEST W








PROCEDURE: CT angiography of the chest with contrast.





TECHNIQUE: Multiple contiguous axial images were obtained through


the chest after uneventful bolus administration of intravenous


contrast. 3D reconstructed CTA MIP acquisitions were also


performed.


Auto Exposure Controls were utilized during the CT exam to meet


ALARA standards for radiation dose reduction.


 


INDICATION: Chest pain with radiculopathy to the back.





COMPARISON: 2019. 2017.





FINDINGS: No evidence of aneurysm or dissection in the thoracic


aorta. No evidence of pulmonary emboli to the subsegmental


pulmonary arteries. The heart size is within normal limits. No


pericardial effusion is present. 





There is no mediastinal, hilar, or axillary lymphadenopathy. 





Scarring is visualized in the right upper lobe, similar to the


prior exam. There is associated bronchiectasis and volume loss in


the right upper lobe. Centrilobular and paraseptal emphysema is


seen in the lungs. Calcified granulomas are scattered throughout


the lungs. The lungs demonstrate no pulmonary nodules or masses.


There are no focal areas of consolidation. No central


endobronchial obstructing lesions are identified. 





There is no pleural effusion or pneumothorax. 





The osseous structures demonstrate no acute abnormalities. 





Limited views of the upper abdominal structures demonstrate no


acute abnormalities. Both adrenal glands are unremarkable.





IMPRESSION: 


1. No evidence of dissection or aneurysm in the thoracic aorta.


2. No evidence of pulmonary emboli to the subsegmental pulmonary


arteries.


3. Chronic scarring in the right upper lobe. This is similar to


the prior exam.


4. Centrilobular and paraseptal emphysema. 


5. Scattered calcified granulomas throughout both lungs,


suggestive of prior granulomatous disease.





Dictated by: 





  Dictated on workstation # [id number]








Dict:   22 1247


Trans:   22 1300


[id number]





Interpreted by:     NAYLA COSTA DO


Electronically signed by: NAYLA COSTA DO 22 1300


   Reviewed:  Reviewed by Me, Discussed w/Radiologist


 (RENETTA FINNEY)





Departure


Impression





   Primary Impression:  


   Left shoulder pain


   Additional Impression:  


   Hypertension


Disposition:  01 HOME, SELF-CARE


Condition:  Stable





Departure-Patient Inst.


Decision time for Depature:  13:40


 (RENETTA FINNEY)


Referrals:  


ANDREW EWING DO (PCP/Family)


Primary Care Physician


Patient Instructions:  High Blood Pressure ED, Shoulder Pain (DC)





Add. Discharge Instructions:  


Plan: 


1. No lifting anything over 20 pounds. No straining, pulling, lifting x 1 week. 


2. Follow up with Dr. Ordaz he would like to see you next week, please call 

office to schedule close follow up.


3. Take blood pressure medications daily as directed. Continue your home Plavix 

daily as directed. 


4. Follow up with your primary care provider if symptoms persist despite 

resting. May take Tylenol as needed. 


5. Return for any new, concerning, or worsening symptoms. 





All discharge instructions reviewed with patient and/or family. Voiced 

understanding.


Scripts


Lisinopril (Lisinopril) 5 Mg Tablet


5 MG PO DAILY for 30 Days, #30 TAB 0 Refills


   Prov: RENETTA FINNEY APRN         22 


Amlodipine Besylate (Amlodipine Besylate) 10 Mg Tablet


10 MG PO DAILY for 30 Days, #30 TAB 0 Refills


   Prov: RENETTA FINNEY APRN         22








ATTENDING PHYSICIAN NOTE:


I was physically present as attending physician in the emergency department 

during the care of this patient.  I received the initial phone call and report 

from Dr. Ewing which was in turn shared with Renetta Finney NP.  I reviewed 

the case with Renetta after she interviewed the patient.  We discussed imaging 

modalities.  I recommended discussion with the cardiologist regarding specific i

nterpretation of imaging of the subclavian vessels.  I did not personally 

examine or interview this patient.


 (TEJAL MUNGUIA MD)





Copy


Copies To 1:   ANDREW EWING STORMY D APRN           2022 12:22


TEJAL MUNGUIA MD        2022 06:25

## 2022-04-26 NOTE — DIAGNOSTIC IMAGING REPORT
PROCEDURE: CT angiography of the chest with contrast.



TECHNIQUE: Multiple contiguous axial images were obtained through

the chest after uneventful bolus administration of intravenous

contrast. 3D reconstructed CTA MIP acquisitions were also

performed.

Auto Exposure Controls were utilized during the CT exam to meet

ALARA standards for radiation dose reduction.

 

INDICATION: Chest pain with radiculopathy to the back.



COMPARISON: 02/16/2019. 08/30/2017.



FINDINGS: No evidence of aneurysm or dissection in the thoracic

aorta. No evidence of pulmonary emboli to the subsegmental

pulmonary arteries. The heart size is within normal limits. No

pericardial effusion is present. 



There is no mediastinal, hilar, or axillary lymphadenopathy. 



Scarring is visualized in the right upper lobe, similar to the

prior exam. There is associated bronchiectasis and volume loss in

the right upper lobe. Centrilobular and paraseptal emphysema is

seen in the lungs. Calcified granulomas are scattered throughout

the lungs. The lungs demonstrate no pulmonary nodules or masses.

There are no focal areas of consolidation. No central

endobronchial obstructing lesions are identified. 



There is no pleural effusion or pneumothorax. 



The osseous structures demonstrate no acute abnormalities. 



Limited views of the upper abdominal structures demonstrate no

acute abnormalities. Both adrenal glands are unremarkable.



IMPRESSION: 

1. No evidence of dissection or aneurysm in the thoracic aorta.

2. No evidence of pulmonary emboli to the subsegmental pulmonary

arteries.

3. Chronic scarring in the right upper lobe. This is similar to

the prior exam.

4. Centrilobular and paraseptal emphysema. 

5. Scattered calcified granulomas throughout both lungs,

suggestive of prior granulomatous disease.



Dictated by: 



  Dictated on workstation # [id number]

## 2023-11-01 NOTE — DIAGNOSTIC IMAGING REPORT
INDICATION: Screening.



EXAMINATION: Digital mammogram bilateral screening with 3-D

tomosynthesis.



The current study was also evaluated with a Computer Aided

Detection (CAD) system.



This study was compared to the prior exams of 09/18/2017 and

06/12/2015. At this time, there are no current complaints.



FINDINGS: There are scattered fibroglandular densities in both

breasts which could obscure a lesion. On the MLO view of the

right breast in the midportion of the superior half of the breast

roughly 4 cm from the nipple, there is a 6 mm asymmetric density.

There is no corresponding abnormality seen on the craniocaudal

view though this density does seem to persist somewhat on the

tomographic images. I would recommend that a compression view of

this area be obtained in the MLO projection as well as a true

lateral view for further study. Ultrasound should also be

performed.



The left breast is unchanged.



IMPRESSION: Additional mammographic views of the right breast

would be recommended. Ultrasound should also be performed.



ACR BI-RADS Category 0: Incomplete. (Needs additional imaging

evaluation).

Result letter will be mailed to the patient.

Note: At least 10% of breast cancer is not imaged by mammography.





  Dictated on workstation # [id number] Mother's Bedside/Non- oral